# Patient Record
Sex: MALE | Race: WHITE | NOT HISPANIC OR LATINO | URBAN - METROPOLITAN AREA
[De-identification: names, ages, dates, MRNs, and addresses within clinical notes are randomized per-mention and may not be internally consistent; named-entity substitution may affect disease eponyms.]

---

## 2018-11-25 ENCOUNTER — EMERGENCY (EMERGENCY)
Facility: HOSPITAL | Age: 78
LOS: 1 days | Discharge: HOME | End: 2018-11-25
Attending: EMERGENCY MEDICINE | Admitting: EMERGENCY MEDICINE
Payer: MEDICARE

## 2018-11-25 VITALS
HEART RATE: 81 BPM | TEMPERATURE: 97 F | OXYGEN SATURATION: 98 % | SYSTOLIC BLOOD PRESSURE: 155 MMHG | DIASTOLIC BLOOD PRESSURE: 91 MMHG | RESPIRATION RATE: 17 BRPM

## 2018-11-25 VITALS
HEART RATE: 63 BPM | DIASTOLIC BLOOD PRESSURE: 73 MMHG | OXYGEN SATURATION: 98 % | TEMPERATURE: 98 F | RESPIRATION RATE: 16 BRPM | SYSTOLIC BLOOD PRESSURE: 148 MMHG

## 2018-11-25 LAB
ALBUMIN SERPL ELPH-MCNC: 3.9 G/DL — SIGNIFICANT CHANGE UP (ref 3.3–5)
ALP SERPL-CCNC: 46 U/L — SIGNIFICANT CHANGE UP (ref 40–120)
ALT FLD-CCNC: 11 U/L — SIGNIFICANT CHANGE UP (ref 10–45)
ANION GAP SERPL CALC-SCNC: 13 MMOL/L — SIGNIFICANT CHANGE UP (ref 5–17)
APTT BLD: 32.4 SEC — SIGNIFICANT CHANGE UP (ref 27.5–36.3)
AST SERPL-CCNC: 24 U/L — SIGNIFICANT CHANGE UP (ref 10–40)
BASOPHILS NFR BLD AUTO: 0.5 % — SIGNIFICANT CHANGE UP (ref 0–2)
BILIRUB SERPL-MCNC: 1 MG/DL — SIGNIFICANT CHANGE UP (ref 0.2–1.2)
BUN SERPL-MCNC: 15 MG/DL — SIGNIFICANT CHANGE UP (ref 7–23)
CALCIUM SERPL-MCNC: 9.2 MG/DL — SIGNIFICANT CHANGE UP (ref 8.4–10.5)
CHLORIDE SERPL-SCNC: 97 MMOL/L — SIGNIFICANT CHANGE UP (ref 96–108)
CO2 SERPL-SCNC: 26 MMOL/L — SIGNIFICANT CHANGE UP (ref 22–31)
CREAT SERPL-MCNC: 0.68 MG/DL — SIGNIFICANT CHANGE UP (ref 0.5–1.3)
EOSINOPHIL NFR BLD AUTO: 4.6 % — SIGNIFICANT CHANGE UP (ref 0–6)
GLUCOSE SERPL-MCNC: 114 MG/DL — HIGH (ref 70–99)
HCT VFR BLD CALC: 37.5 % — LOW (ref 39–50)
HGB BLD-MCNC: 12.4 G/DL — LOW (ref 13–17)
INR BLD: 1.31 — HIGH (ref 0.88–1.16)
LYMPHOCYTES # BLD AUTO: 17 % — SIGNIFICANT CHANGE UP (ref 13–44)
MCHC RBC-ENTMCNC: 29.6 PG — SIGNIFICANT CHANGE UP (ref 27–34)
MCHC RBC-ENTMCNC: 33.1 G/DL — SIGNIFICANT CHANGE UP (ref 32–36)
MCV RBC AUTO: 89.5 FL — SIGNIFICANT CHANGE UP (ref 80–100)
MONOCYTES NFR BLD AUTO: 9.2 % — SIGNIFICANT CHANGE UP (ref 2–14)
NEUTROPHILS NFR BLD AUTO: 68.7 % — SIGNIFICANT CHANGE UP (ref 43–77)
PLATELET # BLD AUTO: 294 K/UL — SIGNIFICANT CHANGE UP (ref 150–400)
POTASSIUM SERPL-MCNC: 4.4 MMOL/L — SIGNIFICANT CHANGE UP (ref 3.5–5.3)
POTASSIUM SERPL-SCNC: 4.4 MMOL/L — SIGNIFICANT CHANGE UP (ref 3.5–5.3)
PROT SERPL-MCNC: 6.5 G/DL — SIGNIFICANT CHANGE UP (ref 6–8.3)
PROTHROM AB SERPL-ACNC: 14.9 SEC — HIGH (ref 10–12.9)
RBC # BLD: 4.19 M/UL — LOW (ref 4.2–5.8)
RBC # FLD: 12.7 % — SIGNIFICANT CHANGE UP (ref 10.3–16.9)
SODIUM SERPL-SCNC: 136 MMOL/L — SIGNIFICANT CHANGE UP (ref 135–145)
WBC # BLD: 7.6 K/UL — SIGNIFICANT CHANGE UP (ref 3.8–10.5)
WBC # FLD AUTO: 7.6 K/UL — SIGNIFICANT CHANGE UP (ref 3.8–10.5)

## 2018-11-25 PROCEDURE — 85610 PROTHROMBIN TIME: CPT

## 2018-11-25 PROCEDURE — 99284 EMERGENCY DEPT VISIT MOD MDM: CPT | Mod: 25

## 2018-11-25 PROCEDURE — 85025 COMPLETE CBC W/AUTO DIFF WBC: CPT

## 2018-11-25 PROCEDURE — 70450 CT HEAD/BRAIN W/O DYE: CPT

## 2018-11-25 PROCEDURE — 85730 THROMBOPLASTIN TIME PARTIAL: CPT

## 2018-11-25 PROCEDURE — 36415 COLL VENOUS BLD VENIPUNCTURE: CPT

## 2018-11-25 PROCEDURE — 80053 COMPREHEN METABOLIC PANEL: CPT

## 2018-11-25 PROCEDURE — 12001 RPR S/N/AX/GEN/TRNK 2.5CM/<: CPT

## 2018-11-25 PROCEDURE — 93005 ELECTROCARDIOGRAM TRACING: CPT | Mod: XU

## 2018-11-25 PROCEDURE — 70450 CT HEAD/BRAIN W/O DYE: CPT | Mod: 26

## 2018-11-25 NOTE — ED ADULT NURSE NOTE - OBJECTIVE STATEMENT
AOX3 +ambulatory 78y old male hx of vertigo complaining of dizziness and fell on the street today. Patient sustained 1.5 cm laceration on the back of the head noted with minimal bleeding. Patient denies any LOC not on any blood thinners. Wound irrigated with NS. AOX3 +ambulatory 78y old male hx of vertigo complaining of dizziness and fell on the street today. Patient sustained 1.5 cm laceration on the back of the head noted with minimal bleeding. Patient denies any LOC, chest pains and not on any blood thinners.  Wound irrigated with NS. Unk TDAP.

## 2018-11-25 NOTE — ED ADULT NURSE NOTE - CHIEF COMPLAINT QUOTE
biba A&Ox4 c/o pain to back of head.  Pt got out of [employer], got lightheaded, fell backwards, hit his head on the pavement.  Hx vertigo.  Denies loc, neck / back pain, use of thinners.  .  EKG in progress

## 2018-11-25 NOTE — ED ADULT TRIAGE NOTE - CHIEF COMPLAINT QUOTE
biba A&Ox4 c/o pain to back of head.  Pt got out of [employer], got lightheaded, fell backwards, hit his head on the pavement.  Hx vertigo.  Denies loc, neck / back pain, use of thinners.  .  EKG in progress

## 2018-11-25 NOTE — ED PROVIDER NOTE - MEDICAL DECISION MAKING DETAILS
Pt with small laceration to left scalp - no LOC, no focal neuro deficits, ct head neg, lac repaired with two staples.  see procedure note.  Pt received wound instructions.

## 2018-11-25 NOTE — ED ADULT NURSE NOTE - NSIMPLEMENTINTERV_GEN_ALL_ED
Implemented All Fall with Harm Risk Interventions:  [place] to call system. Call bell, personal items and telephone within reach. Instruct patient to call for assistance. Room bathroom lighting operational. Non-slip footwear when patient is off stretcher. Physically safe environment: no spills, clutter or unnecessary equipment. Stretcher in lowest position, wheels locked, appropriate side rails in place. Provide visual cue, wrist band, yellow gown, etc. Monitor gait and stability. Monitor for mental status changes and reorient to person, place, and time. Review medications for side effects contributing to fall risk. Reinforce activity limits and safety measures with patient and family. Provide visual clues: red socks.

## 2018-11-29 DIAGNOSIS — S09.90XA UNSPECIFIED INJURY OF HEAD, INITIAL ENCOUNTER: ICD-10-CM
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
## 2018-11-29 DIAGNOSIS — R42 DIZZINESS AND GIDDINESS: ICD-10-CM

## 2018-11-29 DIAGNOSIS — S01.01XA LACERATION WITHOUT FOREIGN BODY OF SCALP, INITIAL ENCOUNTER: ICD-10-CM
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
## 2018-12-03 ENCOUNTER — EMERGENCY (EMERGENCY)
Facility: HOSPITAL | Age: 78
LOS: 1 days | Discharge: HOME | End: 2018-12-03
Admitting: EMERGENCY MEDICINE
Payer: MEDICARE

## 2018-12-03 VITALS
OXYGEN SATURATION: 96 % | RESPIRATION RATE: 20 BRPM | SYSTOLIC BLOOD PRESSURE: 138 MMHG | WEIGHT: 149.91 LBS | HEART RATE: 82 BPM | TEMPERATURE: 98 F | DIASTOLIC BLOOD PRESSURE: 73 MMHG

## 2018-12-03 PROCEDURE — 99212 OFFICE O/P EST SF 10 MIN: CPT

## 2018-12-03 NOTE — ED PROVIDER NOTE - OBJECTIVE STATEMENT
77 y/o male with hx of vertigo, parkinsons here for staple removal. pt reports staples placed to scalp 7 days ago. no fever or chills. no d/c. no ha. pt reports currently feeling well.

## 2018-12-03 NOTE — ED ADULT NURSE NOTE - NSIMPLEMENTINTERV_GEN_ALL_ED
Implemented All Universal Safety Interventions:  [place] to call system. Call bell, personal items and telephone within reach. Instruct patient to call for assistance. Room bathroom lighting operational. Non-slip footwear when patient is off stretcher. Physically safe environment: no spills, clutter or unnecessary equipment. Stretcher in lowest position, wheels locked, appropriate side rails in place.

## 2018-12-03 NOTE — ED PROVIDER NOTE - MEDICAL DECISION MAKING DETAILS
staple removal. wound healing well. no evidence of infection. staples removed without complication. f/u with pmd

## 2018-12-03 NOTE — ED ADULT NURSE NOTE - CHPI ED NUR SYMPTOMS NEG
no tingling/no pain/no fever/no nausea/no decreased eating/drinking/no dizziness/no vomiting/no chills/no weakness

## 2018-12-07 DIAGNOSIS — S01.01XD LACERATION WITHOUT FOREIGN BODY OF SCALP, SUBSEQUENT ENCOUNTER: ICD-10-CM
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
## 2020-03-25 NOTE — ED ADULT NURSE NOTE - RESPIRATORY WDL
<<----- Click to add NO significant Past Surgical History
Breathing spontaneous and unlabored. Breath sounds clear and equal bilaterally with regular rhythm.

## 2024-03-24 ENCOUNTER — INPATIENT (INPATIENT)
Facility: HOSPITAL | Age: 84
LOS: 5 days | Discharge: HOME HEALTH | DRG: 178 | End: 2024-03-30
Attending: INTERNAL MEDICINE | Admitting: STUDENT IN AN ORGANIZED HEALTH CARE EDUCATION/TRAINING PROGRAM
Payer: MEDICARE

## 2024-03-24 VITALS
OXYGEN SATURATION: 99 % | HEIGHT: 68 IN | HEART RATE: 65 BPM | TEMPERATURE: 98 F | WEIGHT: 151.02 LBS | DIASTOLIC BLOOD PRESSURE: 76 MMHG | RESPIRATION RATE: 18 BRPM | SYSTOLIC BLOOD PRESSURE: 159 MMHG

## 2024-03-24 DIAGNOSIS — R42 DIZZINESS AND GIDDINESS: ICD-10-CM
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
## 2024-03-24 DIAGNOSIS — D64.9 ANEMIA, UNSPECIFIED: ICD-10-CM

## 2024-03-24 DIAGNOSIS — U07.1 COVID-19: ICD-10-CM

## 2024-03-24 DIAGNOSIS — Z98.49 CATARACT EXTRACTION STATUS, UNSPECIFIED EYE: Chronic | ICD-10-CM

## 2024-03-24 DIAGNOSIS — E87.1 HYPO-OSMOLALITY AND HYPONATREMIA: ICD-10-CM

## 2024-03-24 DIAGNOSIS — I44.7 LEFT BUNDLE-BRANCH BLOCK, UNSPECIFIED: ICD-10-CM

## 2024-03-24 DIAGNOSIS — Z94.7 CORNEAL TRANSPLANT STATUS: Chronic | ICD-10-CM

## 2024-03-24 DIAGNOSIS — K21.9 GASTRO-ESOPHAGEAL REFLUX DISEASE WITHOUT ESOPHAGITIS: ICD-10-CM

## 2024-03-24 DIAGNOSIS — R53.1 WEAKNESS: ICD-10-CM

## 2024-03-24 DIAGNOSIS — Z29.9 ENCOUNTER FOR PROPHYLACTIC MEASURES, UNSPECIFIED: ICD-10-CM

## 2024-03-24 DIAGNOSIS — Z86.69 PERSONAL HISTORY OF OTHER DISEASES OF THE NERVOUS SYSTEM AND SENSE ORGANS: ICD-10-CM

## 2024-03-24 DIAGNOSIS — Z86.79 PERSONAL HISTORY OF OTHER DISEASES OF THE CIRCULATORY SYSTEM: ICD-10-CM

## 2024-03-24 PROBLEM — G20 PARKINSON'S DISEASE: Chronic | Status: ACTIVE | Noted: 2018-12-03

## 2024-03-24 LAB
ALBUMIN SERPL ELPH-MCNC: 4 G/DL — SIGNIFICANT CHANGE UP (ref 3.3–5)
ALP SERPL-CCNC: 52 U/L — SIGNIFICANT CHANGE UP (ref 40–120)
ALT FLD-CCNC: 24 U/L — SIGNIFICANT CHANGE UP (ref 10–45)
ANION GAP SERPL CALC-SCNC: 10 MMOL/L — SIGNIFICANT CHANGE UP (ref 5–17)
ANISOCYTOSIS BLD QL: SLIGHT — SIGNIFICANT CHANGE UP
APPEARANCE UR: ABNORMAL
AST SERPL-CCNC: 42 U/L — HIGH (ref 10–40)
BACTERIA # UR AUTO: NEGATIVE /HPF — SIGNIFICANT CHANGE UP
BASOPHILS # BLD AUTO: 0 K/UL — SIGNIFICANT CHANGE UP (ref 0–0.2)
BASOPHILS NFR BLD AUTO: 0 % — SIGNIFICANT CHANGE UP (ref 0–2)
BILIRUB SERPL-MCNC: 1 MG/DL — SIGNIFICANT CHANGE UP (ref 0.2–1.2)
BILIRUB UR-MCNC: NEGATIVE — SIGNIFICANT CHANGE UP
BUN SERPL-MCNC: 22 MG/DL — SIGNIFICANT CHANGE UP (ref 7–23)
BURR CELLS BLD QL SMEAR: PRESENT — SIGNIFICANT CHANGE UP
CALCIUM SERPL-MCNC: 9.6 MG/DL — SIGNIFICANT CHANGE UP (ref 8.4–10.5)
CHLORIDE SERPL-SCNC: 97 MMOL/L — SIGNIFICANT CHANGE UP (ref 96–108)
CO2 SERPL-SCNC: 25 MMOL/L — SIGNIFICANT CHANGE UP (ref 22–31)
COLOR SPEC: YELLOW — SIGNIFICANT CHANGE UP
CREAT SERPL-MCNC: 0.85 MG/DL — SIGNIFICANT CHANGE UP (ref 0.5–1.3)
DIFF PNL FLD: ABNORMAL
EGFR: 86 ML/MIN/1.73M2 — SIGNIFICANT CHANGE UP
EOSINOPHIL # BLD AUTO: 0 K/UL — SIGNIFICANT CHANGE UP (ref 0–0.5)
EOSINOPHIL NFR BLD AUTO: 0 % — SIGNIFICANT CHANGE UP (ref 0–6)
FLUAV AG NPH QL: SIGNIFICANT CHANGE UP
FLUBV AG NPH QL: SIGNIFICANT CHANGE UP
GLUCOSE SERPL-MCNC: 97 MG/DL — SIGNIFICANT CHANGE UP (ref 70–99)
GLUCOSE UR QL: NEGATIVE MG/DL — SIGNIFICANT CHANGE UP
HCT VFR BLD CALC: 35.6 % — LOW (ref 39–50)
HGB BLD-MCNC: 11.6 G/DL — LOW (ref 13–17)
KETONES UR-MCNC: 15 MG/DL
LEUKOCYTE ESTERASE UR-ACNC: ABNORMAL
LYMPHOCYTES # BLD AUTO: 0.6 K/UL — LOW (ref 1–3.3)
LYMPHOCYTES # BLD AUTO: 10.4 % — LOW (ref 13–44)
MANUAL SMEAR VERIFICATION: SIGNIFICANT CHANGE UP
MCHC RBC-ENTMCNC: 30.3 PG — SIGNIFICANT CHANGE UP (ref 27–34)
MCHC RBC-ENTMCNC: 32.6 GM/DL — SIGNIFICANT CHANGE UP (ref 32–36)
MCV RBC AUTO: 93 FL — SIGNIFICANT CHANGE UP (ref 80–100)
MICROCYTES BLD QL: SLIGHT — SIGNIFICANT CHANGE UP
MONOCYTES # BLD AUTO: 0.7 K/UL — SIGNIFICANT CHANGE UP (ref 0–0.9)
MONOCYTES NFR BLD AUTO: 12.2 % — SIGNIFICANT CHANGE UP (ref 2–14)
NEUTROPHILS # BLD AUTO: 4.44 K/UL — SIGNIFICANT CHANGE UP (ref 1.8–7.4)
NEUTROPHILS NFR BLD AUTO: 76.5 % — SIGNIFICANT CHANGE UP (ref 43–77)
NEUTS BAND # BLD: 0.9 % — SIGNIFICANT CHANGE UP (ref 0–8)
NITRITE UR-MCNC: NEGATIVE — SIGNIFICANT CHANGE UP
OVALOCYTES BLD QL SMEAR: SLIGHT — SIGNIFICANT CHANGE UP
PH UR: 7 — SIGNIFICANT CHANGE UP (ref 5–8)
PLAT MORPH BLD: NORMAL — SIGNIFICANT CHANGE UP
PLATELET # BLD AUTO: 204 K/UL — SIGNIFICANT CHANGE UP (ref 150–400)
POIKILOCYTOSIS BLD QL AUTO: SLIGHT — SIGNIFICANT CHANGE UP
POLYCHROMASIA BLD QL SMEAR: SLIGHT — SIGNIFICANT CHANGE UP
POTASSIUM SERPL-MCNC: 4 MMOL/L — SIGNIFICANT CHANGE UP (ref 3.5–5.3)
POTASSIUM SERPL-SCNC: 4 MMOL/L — SIGNIFICANT CHANGE UP (ref 3.5–5.3)
PROT SERPL-MCNC: 6.7 G/DL — SIGNIFICANT CHANGE UP (ref 6–8.3)
PROT UR-MCNC: SIGNIFICANT CHANGE UP MG/DL
RBC # BLD: 3.83 M/UL — LOW (ref 4.2–5.8)
RBC # FLD: 13 % — SIGNIFICANT CHANGE UP (ref 10.3–14.5)
RBC BLD AUTO: ABNORMAL
RBC CASTS # UR COMP ASSIST: 6 /HPF — HIGH (ref 0–4)
RSV RNA NPH QL NAA+NON-PROBE: SIGNIFICANT CHANGE UP
SARS-COV-2 RNA SPEC QL NAA+PROBE: DETECTED
SODIUM SERPL-SCNC: 132 MMOL/L — LOW (ref 135–145)
SP GR SPEC: 1.02 — SIGNIFICANT CHANGE UP (ref 1–1.03)
SQUAMOUS # UR AUTO: 0 /HPF — SIGNIFICANT CHANGE UP (ref 0–5)
UROBILINOGEN FLD QL: 1 MG/DL — SIGNIFICANT CHANGE UP (ref 0.2–1)
WBC # BLD: 5.74 K/UL — SIGNIFICANT CHANGE UP (ref 3.8–10.5)
WBC # FLD AUTO: 5.74 K/UL — SIGNIFICANT CHANGE UP (ref 3.8–10.5)
WBC UR QL: 1 /HPF — SIGNIFICANT CHANGE UP (ref 0–5)

## 2024-03-24 PROCEDURE — 99223 1ST HOSP IP/OBS HIGH 75: CPT

## 2024-03-24 PROCEDURE — 71045 X-RAY EXAM CHEST 1 VIEW: CPT | Mod: 26

## 2024-03-24 PROCEDURE — 70450 CT HEAD/BRAIN W/O DYE: CPT | Mod: 26

## 2024-03-24 PROCEDURE — 99285 EMERGENCY DEPT VISIT HI MDM: CPT

## 2024-03-24 RX ORDER — CARBIDOPA AND LEVODOPA 25; 100 MG/1; MG/1
1 TABLET ORAL
Refills: 0 | DISCHARGE

## 2024-03-24 RX ORDER — METOPROLOL TARTRATE 50 MG
1 TABLET ORAL
Refills: 0 | DISCHARGE

## 2024-03-24 RX ORDER — CARBIDOPA AND LEVODOPA 25; 100 MG/1; MG/1
1 TABLET ORAL THREE TIMES A DAY
Refills: 0 | Status: DISCONTINUED | OUTPATIENT
Start: 2024-03-24 | End: 2024-03-30

## 2024-03-24 RX ORDER — BENZOCAINE AND MENTHOL 5; 1 G/100ML; G/100ML
1 LIQUID ORAL
Refills: 0 | Status: DISCONTINUED | OUTPATIENT
Start: 2024-03-24 | End: 2024-03-30

## 2024-03-24 RX ORDER — ENOXAPARIN SODIUM 100 MG/ML
40 INJECTION SUBCUTANEOUS EVERY 24 HOURS
Refills: 0 | Status: DISCONTINUED | OUTPATIENT
Start: 2024-03-24 | End: 2024-03-30

## 2024-03-24 RX ORDER — ROSUVASTATIN CALCIUM 5 MG/1
1 TABLET ORAL
Refills: 0 | DISCHARGE

## 2024-03-24 RX ORDER — MECLIZINE HCL 12.5 MG
25 TABLET ORAL ONCE
Refills: 0 | Status: COMPLETED | OUTPATIENT
Start: 2024-03-24 | End: 2024-03-24

## 2024-03-24 RX ORDER — SODIUM CHLORIDE 9 MG/ML
1000 INJECTION INTRAMUSCULAR; INTRAVENOUS; SUBCUTANEOUS ONCE
Refills: 0 | Status: COMPLETED | OUTPATIENT
Start: 2024-03-24 | End: 2024-03-24

## 2024-03-24 RX ORDER — PANTOPRAZOLE SODIUM 20 MG/1
40 TABLET, DELAYED RELEASE ORAL
Refills: 0 | Status: DISCONTINUED | OUTPATIENT
Start: 2024-03-24 | End: 2024-03-30

## 2024-03-24 RX ORDER — METOPROLOL TARTRATE 50 MG
50 TABLET ORAL DAILY
Refills: 0 | Status: DISCONTINUED | OUTPATIENT
Start: 2024-03-24 | End: 2024-03-29

## 2024-03-24 RX ORDER — ATORVASTATIN CALCIUM 80 MG/1
40 TABLET, FILM COATED ORAL AT BEDTIME
Refills: 0 | Status: DISCONTINUED | OUTPATIENT
Start: 2024-03-24 | End: 2024-03-30

## 2024-03-24 RX ORDER — OMEPRAZOLE 10 MG/1
1 CAPSULE, DELAYED RELEASE ORAL
Refills: 0 | DISCHARGE

## 2024-03-24 RX ADMIN — SODIUM CHLORIDE 1000 MILLILITER(S): 9 INJECTION INTRAMUSCULAR; INTRAVENOUS; SUBCUTANEOUS at 10:05

## 2024-03-24 RX ADMIN — ENOXAPARIN SODIUM 40 MILLIGRAM(S): 100 INJECTION SUBCUTANEOUS at 22:13

## 2024-03-24 RX ADMIN — CARBIDOPA AND LEVODOPA 1 TABLET(S): 25; 100 TABLET ORAL at 21:24

## 2024-03-24 RX ADMIN — ATORVASTATIN CALCIUM 40 MILLIGRAM(S): 80 TABLET, FILM COATED ORAL at 21:24

## 2024-03-24 RX ADMIN — Medication 25 MILLIGRAM(S): at 10:13

## 2024-03-24 RX ADMIN — BENZOCAINE AND MENTHOL 1 LOZENGE: 5; 1 LIQUID ORAL at 18:45

## 2024-03-24 RX ADMIN — Medication 50 MILLIGRAM(S): at 18:24

## 2024-03-24 NOTE — H&P ADULT - PROBLEM SELECTOR PLAN 8
Hb 11.6, no active signs of bleed    - transfuse for Hb <7  - maintain active T&S Na 132 likely 2/2 poor PO intake. Received 1L NS in ED    - trend Na  - encourage PO intake  - obtain urine lytes if downtrending

## 2024-03-24 NOTE — H&P ADULT - NSHPPHYSICALEXAM_GEN_ALL_CORE
PHYSICAL EXAM:  GENERAL: Pt lying in bed comfortably in NAD  HEAD:  Atraumatic   EYES: EOMI, right eye opacity ~3-4mm non-reactive, left eye 2mm mildly reactive, conjunctiva and sclera clear  ENT: Moist mucous membranes  NECK: Supple, No JVD  CHEST/LUNG: Clear to auscultation bilaterally; No rales, rhonchi, wheezing or rubs. Unlabored respirations  HEART: Regular rate and rhythm; No murmurs, rubs, or gallops  ABDOMEN: Bowel sounds present; Soft, Nontender, Nondistended. No guarding or rigidity    EXTREMITIES:  2+ Peripheral Pulses, brisk capillary refill. No clubbing, cyanosis, or edema  NERVOUS SYSTEM:  Alert & Oriented X3, speech clear. Answers questions appropriately. Facial movements symmetrical, no facial droop, tongue protrusion midline. Full and equal 5/5 strength B/L upper and lower extremities. . Sensation intact. No motor drift. No deficits   MSK: FROM x 4 extremities   SKIN: No rashes or lesions

## 2024-03-24 NOTE — H&P ADULT - PROBLEM SELECTOR PLAN 1
Episode of fall due to lower extremity weakness this morning when ambulating to bathroom. Likely in setting of COVID-19 infection. CT head without acute pathology and neurology exam without focal deficit    - encourage PO hydration  - orthostatic  - TSH  - PT consult Episode of fall due to lower extremity weakness this morning when ambulating to bathroom. Likely in setting of COVID-19 infection. CT head without acute pathology and neurology exam without focal deficit    - encourage PO hydration  - orthostatic  - fall precaution  - TSH  - PT consult

## 2024-03-24 NOTE — H&P ADULT - NSHPSOCIALHISTORY_GEN_ALL_CORE
Lives at home, has health aides. No smoking, drinking, or recreational drug use. Retired, worked as a .

## 2024-03-24 NOTE — ED ADULT NURSE NOTE - CHIEF COMPLAINT QUOTE
Pt to ED c/o multiple falls over last 2 days. Pt denies syncope nor head strike. Per pt "My legs keep giving out and I have weakness". No fever, chills, n/v/d, chest pain, nor visual changes. HHA at bedside. no blood thinners.

## 2024-03-24 NOTE — ED ADULT NURSE NOTE - NS ED NURSE LEVEL OF CONSCIOUSNESS AFFECT
[FreeTextEntry1] : Spoke with patient extensively about diet. \par She is eating foods that are mostly simple carbs and sugar. Little vegetables or nutritious snacks\par The main issue is access to food and what is bought for the home tends to be these types of foods. \par Will reach out to nurse Elizabeth to advised better nutrition and exercise program Calm

## 2024-03-24 NOTE — H&P ADULT - PROBLEM SELECTOR PLAN 4
On carbidopa-levodopa  TID. Follows neurologist Dr. Berlin Sanchez    - continue carbidopa-levodopa  TID On carbidopa-levodopa  TID. Follows neurologist Dr. Berlin Sanchez    - continue carbidopa-levodopa  TID    #?HTN  #LBBB  Hx of LBBB. follows cardiologist outpatient. Takes metoprolol 50mg daily  - continue metoprolol 50mg daily with old parameters On carbidopa-levodopa  TID. Follows neurologist Dr. Berlin Sanchez    - continue carbidopa-levodopa  TID    #?HTN  Elevated blood pressures, SBP 150s, suspect history of HTN as patient on metoprolol (pt is unsure why he is on it)  - continue metoprolol 50mg daily with hold parameters

## 2024-03-24 NOTE — H&P ADULT - PROBLEM SELECTOR PLAN 9
Fluids: s/p 1L NS  Electrolytes: replete to keep k>4 and Mg>2  Nutrition: Regular diet  DVT ppx: Lovenox  GI ppx: pantoprazole  Code status: Full code  Dispo: RMF Hb 11.6, no active signs of bleed    - transfuse for Hb <7  - maintain active T&S

## 2024-03-24 NOTE — PATIENT PROFILE ADULT - FALL HARM RISK - HARM RISK INTERVENTIONS
Assistance with ambulation/Assistance OOB with selected safe patient handling equipment/Communicate Risk of Fall with Harm to all staff/Discuss with provider need for PT consult/Monitor gait and stability/Reinforce activity limits and safety measures with patient and family/Sit up slowly, dangle for a short time, stand at bedside before walking/Tailored Fall Risk Interventions/Visual Cue: Yellow wristband and red socks/Bed in lowest position, wheels locked, appropriate side rails in place/Call bell, personal items and telephone in reach/Instruct patient to call for assistance before getting out of bed or chair/Non-slip footwear when patient is out of bed/[place] to call system/Physically safe environment - no spills, clutter or unnecessary equipment/Purposeful Proactive Rounding/Room/bathroom lighting operational, light cord in reach

## 2024-03-24 NOTE — ED PROVIDER NOTE - CARE PLAN
1 Principal Discharge DX:	Acute weakness  Secondary Diagnosis:	Acute bronchitis due to COVID-19 virus

## 2024-03-24 NOTE — H&P ADULT - PROBLEM SELECTOR PLAN 2
Patient wife is informed  Likely 2/2 COVID-19 infection. Pt reports imbalance which he attributes to being unsteady on feet which is likely 2/2 his COVID-19 infection    - management as above in fall

## 2024-03-24 NOTE — ED PROVIDER NOTE - PHYSICAL EXAMINATION
CONSTITUTIONAL: Well-appearing; in no apparent distress.   HEAD: Normocephalic; atraumatic.   EYES:  conjunctiva and sclera clear  ENT: normal nose; no rhinorrhea; normal pharynx with no erythema or lesions.   NECK: Supple; non-tender;   CARDIOVASCULAR: Normal S1, S2; no murmurs, rubs, or gallops. Regular rate and rhythm.   RESPIRATORY: Breathing easily; breath sounds clear and equal bilaterally; no wheezes, rhonchi, or rales.  GI: Soft; non-distended; non-tender; no palpable organomegaly.   BACK: no tenderness over c/t/l spine  EXT: No cyanosis or edema; N/V intact  SKIN: Normal for age and race; warm; dry; good turgor; no apparent lesions or rash.   NEURO: A & O x 3; face symmetric; no pronator drift, able to range legs equally, strength in bed 5/5 on hip extension, knee flexion/extension, ankle flexion/extension. no pain w/ straight leg raise/. finger to nose normal.   PSYCHOLOGICAL: The patient’s mood and manner are appropriate.

## 2024-03-24 NOTE — H&P ADULT - PROBLEM SELECTOR PLAN 7
Na 132 likely 2/2 poor PO intake. Received 1L NS in ED    - trend Na  - encourage PO intake  - obtain urine lytes if downtrending Takes omeprazole 40mg daily    - continue pantoprazole

## 2024-03-24 NOTE — ED ADULT NURSE NOTE - NSFALLRISKINTERV_ED_ALL_ED

## 2024-03-24 NOTE — H&P ADULT - PROBLEM SELECTOR PLAN 6
Takes omeprazole 40mg daily    - continue pantoprazole Hx of LBBB. follows cardiologist outpatient. Takes metoprolol 50mg daily  - continue metoprolol 50mg daily with hold parameters

## 2024-03-24 NOTE — ED ADULT NURSE NOTE - OBJECTIVE STATEMENT
Pt is an 84yo male PMHx parkinsons, vertigo presents to ED c/o fall. Pt states, "My balance has been off the past few days d/t a pollen allergy I have, my legs feel weak and this morning I fell around 0800." Pt denies head trauma, LOC, use of blood thinners. Pt is A&Ox4,, breathing even and unlabored speaking in full sentences, denies c/p, sob, f/c. Pt is an 82yo male PMHx parkinsons, vertigo presents to ED c/o fall. Pt states, "My balance has been off the past few days d/t a pollen allergy I have, my legs feel weak and this morning I fell around 0800." Pt denies head trauma, LOC, use of blood thinners. Pt is A&Ox4,, breathing even and unlabored speaking in full sentences, denies c/p, sob, f/c. FS obtained, .

## 2024-03-24 NOTE — ED PROVIDER NOTE - CLINICAL SUMMARY MEDICAL DECISION MAKING FREE TEXT BOX
no focal weakness on exam, motor/sensory intact   however when try to get pt up to walk, unable to stand on own and maintain posture, even with assistance  likely 2/2 covid 19, doubt vertigo, no response to meclizine and pt states feels different no focal weakness on exam, motor/sensory intact , doubt stroke or peripheral vertigo at this time, no response to meclizine and pt states feels different  however when try to get pt up to walk, unable to stand on own and maintain posture, even with assistance  likely 2/2 symptomatic covid 19

## 2024-03-24 NOTE — PATIENT PROFILE ADULT - VISION (WITH CORRECTIVE LENSES IF THE PATIENT USUALLY WEARS THEM):
1 pair of glasses at home./Partially impaired: cannot see medication labels or newsprint, but can see obstacles in path, and the surrounding layout; can count fingers at arm's length

## 2024-03-24 NOTE — ED ADULT TRIAGE NOTE - CHIEF COMPLAINT QUOTE
Pt to ED c/o multiple falls over last 2 days. Pt denies syncope nor head strike. Per pt "My legs keep giving out and I have weakness". No fever, chills, n/v/d, chest pain, nor visual changes. HHA at bedside Pt to ED c/o multiple falls over last 2 days. Pt denies syncope nor head strike. Per pt "My legs keep giving out and I have weakness". No fever, chills, n/v/d, chest pain, nor visual changes. HHA at bedside. no blood thinners.

## 2024-03-24 NOTE — ED PROVIDER NOTE - OBJECTIVE STATEMENT
82yo male PMHx parkinsons, vertigo presents to ED c/o fall. Pt states, "My balance has been off the past few days d/t a pollen allergy I have, my legs feel weak and this morning I fell around 0800." Pt denies head trauma, LOC, use of blood thinners 84yo male PMHx parkinsons, vertigo presents to ED c/o fall. Pt states, "My balance has been off the past few days d/t a pollen allergy I have, my legs feel weak and this morning I fell around 0800." Pt denies head trauma, LOC, use of blood thinners. Denies any dizziness, room spinning. States that he feels like his legs give out when he stands, normally ambulates with walker and has a HHA, fell x 2 despite having HHA because could not support himself. Denies headache, double vision, trouble speaking/swallowing.

## 2024-03-24 NOTE — H&P ADULT - NSICDXFAMILYHX_GEN_ALL_CORE_FT
FAMILY HISTORY:  Father  Still living? Unknown  FH: diabetes mellitus, Age at diagnosis: Age Unknown  FH: heart failure, Age at diagnosis: Age Unknown  FH: HTN (hypertension), Age at diagnosis: Age Unknown

## 2024-03-24 NOTE — H&P ADULT - ATTENDING COMMENTS
84 yo M with PMHx Parkinsons disease, vertigo, GERD, known LBBB BIBEMS from home with 2d hx of recurrent falls in setting of generalized weakness found to have +COVID PCR admitted for further symptomatic management and PT evaluation.     #COVID-19 - Afebrile. HR 60s. BP stable. RR 18. SpO2 99% on RA at rest. WBC 5.74. Bandemia 0.9%. Remainder of CBC/CMP largely within normal limits. +COVID PCR positive. CXR showing increased bibasilar opacification, R>L. Sx of post-nasal drip and sore throat ~4-5d prior which pt attributed to seasonal allergies, now improving. Sx management. No indication for remdesevir/steroids at this time.      #Fall – LE weakness while ambulating. No cardiac complaints. EKG unchanged from prior, known LBBB. Neurological exam without focal neurological deficits. 5/5 strength throughout. No sensory deficits. Suspect imbalance i/s/o Parkinsons vs vertigo vs resolving acute illness (COVID+). CTH NC showing moderate chronic microvascular changes without evidence of an acute transcortical infarction or hemorrhage. F/U orthostatics, TSH. Fall precautions. PT consult.     Agree with remainder of resident plan as above.

## 2024-03-24 NOTE — H&P ADULT - NSHPLABSRESULTS_GEN_ALL_CORE
LABS:  cret                        11.6   5.74  )-----------( 204      ( 24 Mar 2024 09:36 )             35.6     03-24    132<L>  |  97  |  22  ----------------------------<  97  4.0   |  25  |  0.85    Ca    9.6      24 Mar 2024 09:36    TPro  6.7  /  Alb  4.0  /  TBili  1.0  /  DBili  x   /  AST  42<H>  /  ALT  24  /  AlkPhos  52  03-24

## 2024-03-24 NOTE — H&P ADULT - PROBLEM SELECTOR PLAN 3
COVID-19 positive, xray with RUL infiltrate, breathing comfortably on room air.    - Goal o2 sat >94%  - incentive spirometer and OOBTC  - no need for monoclonal antibody and no steroids as pt not hypoxic COVID-19 positive, xray with RUL infiltrate, breathing comfortably on room air.    - Goal o2 sat >94%  - incentive spirometer  - no need for monoclonal antibody and no steroids as pt not hypoxic COVID-19 positive, xray with RUL infiltrate, breathing comfortably on room air.    - Goal o2 sat >94%  - incentive spirometer  - Onset of symptoms >72 hours and without respiratory symptoms so no need for monoclonal antibody and no steroids as pt not hypoxic

## 2024-03-24 NOTE — H&P ADULT - PROBLEM SELECTOR PLAN 10
Fluids: s/p 1L NS  Electrolytes: replete to keep k>4 and Mg>2  Nutrition: Regular diet  DVT ppx: Lovenox  GI ppx: pantoprazole  Code status: Full code  Dispo: RMF

## 2024-03-24 NOTE — H&P ADULT - HISTORY OF PRESENT ILLNESS
CC: weakness, fall  HPI: 83 year old male with PMHx Parkinson's disease, vertigo, GERD, LBBB presents from home after having a fall. Pt reports fall was due to leg weakness and was not mechanical in nature and denies head trauma but did bump into furniture on his back. Reports was walking to kitchen this morning when he developed bilateral LE weakness and fell. Since thursday, pt reports having "allergy symptoms" with sneezing and runny nose and reports he has flares of his allergy occasionally, worse during spring/summer. Around the same time he noticed having imbalance which he attributes to the unsteadiness and weakness of his legs. No recent travel or sick contact but does have health aid who come and go. Denies headache, dizziness, fever, chills, chest pain, SOB, abd pain, dysuria or diarrhea.    In the ED:    - VS: Tmax: 98.2, HR: 65, BP: 159/76, RR: 18, O2: 99% room air     - Pertinent Labs: Hb 11.6, Na 132, + for COVID-19    - Imaging: CXR: RUL infiltrate  CT head: moderate chronic microvascular change    - Treatment/interventions: meclizine 25mg x1, 1L NS

## 2024-03-24 NOTE — ED PROVIDER NOTE - NSCAREINITIATED _GEN_ER
CC:  Kina Turner is here today for Establish Care (Alternate hard to normal stools, daily. Swallow issues on solid foods. Lt knee pain for 1 yr now.)       Medications: medications verified and updated    Refills needed today?  NO    Latex allergy or sensitivity: No known latex allergy     Patient would like communication of their results via:      Cell Phone:   Telephone Information:   Mobile [phone]     Okay to leave a message containing results? No    Patient's current [facility] status: Active.    Advanced directives: discussed    Care Teams Verified: Updated    Depression Screen: no    Tobacco history: verified           Sara Maya(Attending)

## 2024-03-24 NOTE — H&P ADULT - PROBLEM SELECTOR PLAN 5
Reports having history of vertigo numerous times per year and takes meclizine PRN with improvement. Meclizine given in ED however deferred ambulation in setting of recent fall    - meclizine 25mg q6h PRN

## 2024-03-25 LAB
ANION GAP SERPL CALC-SCNC: 9 MMOL/L — SIGNIFICANT CHANGE UP (ref 5–17)
BUN SERPL-MCNC: 15 MG/DL — SIGNIFICANT CHANGE UP (ref 7–23)
CALCIUM SERPL-MCNC: 8.4 MG/DL — SIGNIFICANT CHANGE UP (ref 8.4–10.5)
CHLORIDE SERPL-SCNC: 98 MMOL/L — SIGNIFICANT CHANGE UP (ref 96–108)
CO2 SERPL-SCNC: 25 MMOL/L — SIGNIFICANT CHANGE UP (ref 22–31)
CREAT SERPL-MCNC: 0.73 MG/DL — SIGNIFICANT CHANGE UP (ref 0.5–1.3)
EGFR: 90 ML/MIN/1.73M2 — SIGNIFICANT CHANGE UP
GLUCOSE SERPL-MCNC: 87 MG/DL — SIGNIFICANT CHANGE UP (ref 70–99)
HCT VFR BLD CALC: 35.5 % — LOW (ref 39–50)
HGB BLD-MCNC: 11.6 G/DL — LOW (ref 13–17)
MAGNESIUM SERPL-MCNC: 1.8 MG/DL — SIGNIFICANT CHANGE UP (ref 1.6–2.6)
MCHC RBC-ENTMCNC: 30.4 PG — SIGNIFICANT CHANGE UP (ref 27–34)
MCHC RBC-ENTMCNC: 32.7 GM/DL — SIGNIFICANT CHANGE UP (ref 32–36)
MCV RBC AUTO: 93.2 FL — SIGNIFICANT CHANGE UP (ref 80–100)
NRBC # BLD: 0 /100 WBCS — SIGNIFICANT CHANGE UP (ref 0–0)
PHOSPHATE SERPL-MCNC: 3 MG/DL — SIGNIFICANT CHANGE UP (ref 2.5–4.5)
PLATELET # BLD AUTO: 176 K/UL — SIGNIFICANT CHANGE UP (ref 150–400)
POTASSIUM SERPL-MCNC: 3.9 MMOL/L — SIGNIFICANT CHANGE UP (ref 3.5–5.3)
POTASSIUM SERPL-SCNC: 3.9 MMOL/L — SIGNIFICANT CHANGE UP (ref 3.5–5.3)
RBC # BLD: 3.81 M/UL — LOW (ref 4.2–5.8)
RBC # FLD: 13 % — SIGNIFICANT CHANGE UP (ref 10.3–14.5)
SODIUM SERPL-SCNC: 132 MMOL/L — LOW (ref 135–145)
TSH SERPL-MCNC: 3.49 UIU/ML — SIGNIFICANT CHANGE UP (ref 0.27–4.2)
WBC # BLD: 6.16 K/UL — SIGNIFICANT CHANGE UP (ref 3.8–10.5)
WBC # FLD AUTO: 6.16 K/UL — SIGNIFICANT CHANGE UP (ref 3.8–10.5)

## 2024-03-25 PROCEDURE — 99232 SBSQ HOSP IP/OBS MODERATE 35: CPT

## 2024-03-25 RX ADMIN — ATORVASTATIN CALCIUM 40 MILLIGRAM(S): 80 TABLET, FILM COATED ORAL at 22:26

## 2024-03-25 RX ADMIN — CARBIDOPA AND LEVODOPA 1 TABLET(S): 25; 100 TABLET ORAL at 22:26

## 2024-03-25 RX ADMIN — CARBIDOPA AND LEVODOPA 1 TABLET(S): 25; 100 TABLET ORAL at 06:05

## 2024-03-25 RX ADMIN — CARBIDOPA AND LEVODOPA 1 TABLET(S): 25; 100 TABLET ORAL at 13:27

## 2024-03-25 RX ADMIN — PANTOPRAZOLE SODIUM 40 MILLIGRAM(S): 20 TABLET, DELAYED RELEASE ORAL at 06:05

## 2024-03-25 RX ADMIN — ENOXAPARIN SODIUM 40 MILLIGRAM(S): 100 INJECTION SUBCUTANEOUS at 22:26

## 2024-03-25 NOTE — DIETITIAN INITIAL EVALUATION ADULT - PROBLEM SELECTOR PLAN 2
Likely 2/2 COVID-19 infection. Pt reports imbalance which he attributes to being unsteady on feet which is likely 2/2 his COVID-19 infection    - management as above in fall

## 2024-03-25 NOTE — PROGRESS NOTE ADULT - ASSESSMENT
83 year old male with PMHx Parkinson's disease, vertigo, GERD, LBBB presents from home after having a fall due to weakness and found to be positive for COVID-19 and admitted for further management. 83 year old male with PMHx Parkinson's disease, vertigo, GERD, LBBB presents from home after having a fall due to weakness and found to be positive for COVID-19 and admitted for further management. Respiratory exam unremarkable. No changes compared to prior EKG. CXR displaying RUL infiltrate. No acute infarct or hemorrhage on non-con CT. Will ambulate with PT. 83M with PMH of Parkinson's disease, vertigo, GERD, LBBB presenting with fall due to weakness likely 2/2 COVID-19 pending PT eval.

## 2024-03-25 NOTE — DIETITIAN INITIAL EVALUATION ADULT - PHYSCIAL ASSESSMENT
Weights:   3/24 151 pounds (dosing)   Per HIE:  10/27 153 pounds   UBW: ~150 pounds (per pt) Endorses stable weights x 3 months  IBW: 154 pounds   %IBW: 98%      Weights appear to be stable. RD to continue to monitor weights as available.

## 2024-03-25 NOTE — PROGRESS NOTE ADULT - PROBLEM SELECTOR PLAN 7
Takes omeprazole 40mg daily    - continue pantoprazole Takes omeprazole 40mg daily  - continue pantoprazole

## 2024-03-25 NOTE — PROGRESS NOTE ADULT - PROBLEM SELECTOR PLAN 8
Na 132 likely 2/2 poor PO intake. Received 1L NS in ED    - trend Na  - encourage PO intake  - obtain urine lytes if downtrending Na 132 likely 2/2 poor PO intake. Received 1L NS in ED  - trend Na  - encourage PO intake  - obtain urine lytes if downtrending

## 2024-03-25 NOTE — DIETITIAN INITIAL EVALUATION ADULT - PERTINENT LABORATORY DATA
03-25    132<L>  |  98  |  15  ----------------------------<  87  3.9   |  25  |  0.73    Ca    8.4      25 Mar 2024 05:30  Phos  3.0     03-25  Mg     1.8     03-25    TPro  6.7  /  Alb  4.0  /  TBili  1.0  /  DBili  x   /  AST  42<H>  /  ALT  24  /  AlkPhos  52  03-24

## 2024-03-25 NOTE — DIETITIAN INITIAL EVALUATION ADULT - PROBLEM SELECTOR PLAN 6
Hx of LBBB. follows cardiologist outpatient. Takes metoprolol 50mg daily  - continue metoprolol 50mg daily with hold parameters

## 2024-03-25 NOTE — PROGRESS NOTE ADULT - PROBLEM SELECTOR PLAN 4
On carbidopa-levodopa  TID. Follows neurologist Dr. Berlin Sanchez    - continue carbidopa-levodopa  TID    #?HTN  Elevated blood pressures, SBP 150s, suspect history of HTN as patient on metoprolol (pt is unsure why he is on it)  - continue metoprolol 50mg daily with hold parameters On carbidopa-levodopa  TID. Follows neurologist Dr. Berlin Sanchez  - continue carbidopa-levodopa  TID    #HTN  Elevated blood pressures on admission with SBP 150s. Patient states he was on Lisinopril and Metoprolol, however Lisinopril was discontinued since BPs were well controlled. Pt is unsure why he is still on Metoprolol  - continue metoprolol 50mg daily  - f/u PCP

## 2024-03-25 NOTE — PROGRESS NOTE ADULT - SUBJECTIVE AND OBJECTIVE BOX
OVERNIGHT EVENTS:    SUBJECTIVE / INTERVAL HPI: Patient seen and examined at bedside. No complaints at this time. Patient denies: fever, chills, dizziness, weakness, HA, Changes in vision, CP, palpitations, SOB, cough, N/V/D/C, dysuria, changes in bowel movements, LE edema. ROS otherwise negative.    VITAL SIGNS:  Vital Signs Last 24 Hrs  T(C): 36.4 (25 Mar 2024 03:45), Max: 37 (24 Mar 2024 14:47)  T(F): 97.6 (25 Mar 2024 03:45), Max: 98.6 (24 Mar 2024 14:47)  HR: 58 (25 Mar 2024 03:45) (58 - 78)  BP: 150/83 (25 Mar 2024 03:45) (147/83 - 163/72)  BP(mean): --  RR: 17 (25 Mar 2024 03:45) (17 - 18)  SpO2: 99% (25 Mar 2024 03:45) (96% - 99%)    Parameters below as of 25 Mar 2024 03:45  Patient On (Oxygen Delivery Method): room air        PHYSICAL EXAM:    General: NAD  HEENT: NCAT  Neck: supple, trachea midline  Cardiovascular: S1, S2 normal; RRR, no M/G/R  Respiratory: CTABL; no W/R/R  Gastrointestinal: soft, nontender, nondistended. bowel sounds present.  Skin: no ulcerations or visible rashes appreciated  Extremities: WWP; no edema, clubbing or cyanosis  Vascular: 2+ radial, DP/PT pulses B/L  Neurological: AAOx3; CN II-XII grossly intact; no focal deficits    MEDICATIONS:  MEDICATIONS  (STANDING):  atorvastatin 40 milliGRAM(s) Oral at bedtime  carbidopa/levodopa  25/100 1 Tablet(s) Oral three times a day  enoxaparin Injectable 40 milliGRAM(s) SubCutaneous every 24 hours  metoprolol succinate ER 50 milliGRAM(s) Oral daily  pantoprazole    Tablet 40 milliGRAM(s) Oral before breakfast    MEDICATIONS  (PRN):  benzocaine/menthol Lozenge 1 Lozenge Oral two times a day PRN Sore Throat      ALLERGIES:  Allergies    No Known Allergies    Intolerances        LABS:                        11.6   5.74  )-----------( 204      ( 24 Mar 2024 09:36 )             35.6     03-24    132<L>  |  97  |  22  ----------------------------<  97  4.0   |  25  |  0.85    Ca    9.6      24 Mar 2024 09:36    TPro  6.7  /  Alb  4.0  /  TBili  1.0  /  DBili  x   /  AST  42<H>  /  ALT  24  /  AlkPhos  52  03-24      Urinalysis Basic - ( 24 Mar 2024 09:36 )    Color: Yellow / Appearance: Cloudy / S.020 / pH: x  Gluc: 97 mg/dL / Ketone: 15 mg/dL  / Bili: Negative / Urobili: 1.0 mg/dL   Blood: x / Protein: Trace mg/dL / Nitrite: Negative   Leuk Esterase: Trace / RBC: 6 /HPF / WBC 1 /HPF   Sq Epi: x / Non Sq Epi: 0 /HPF / Bacteria: Negative /HPF      CAPILLARY BLOOD GLUCOSE      POCT Blood Glucose.: 101 mg/dL (24 Mar 2024 08:48)      RADIOLOGY & ADDITIONAL TESTS: Reviewed. OVERNIGHT EVENTS:    SUBJECTIVE / INTERVAL HPI: Patient seen and examined at bedside. No complaints at this time. ROS otherwise negative.    VITAL SIGNS:  Vital Signs Last 24 Hrs  T(C): 36.4 (25 Mar 2024 03:45), Max: 37 (24 Mar 2024 14:47)  T(F): 97.6 (25 Mar 2024 03:45), Max: 98.6 (24 Mar 2024 14:47)  HR: 58 (25 Mar 2024 03:45) (58 - 78)  BP: 150/83 (25 Mar 2024 03:45) (147/83 - 163/72)  BP(mean): --  RR: 17 (25 Mar 2024 03:45) (17 - 18)  SpO2: 99% (25 Mar 2024 03:45) (96% - 99%)    Parameters below as of 25 Mar 2024 03:45  Patient On (Oxygen Delivery Method): room air        PHYSICAL EXAM:    General: NAD  HEENT: NCAT  Neck: supple, trachea midline  Cardiovascular: S1, S2 normal; RRR, no M/G/R  Respiratory: CTABL; no W/R/R  Gastrointestinal: soft, nontender, nondistended. bowel sounds present.  Skin: no ulcerations or visible rashes appreciated  Extremities: WWP; no edema, clubbing or cyanosis  Vascular: 2+ radial, DP/PT pulses B/L  Neurological: AAOx3; CN II-XII grossly intact; no focal deficits    MEDICATIONS:  MEDICATIONS  (STANDING):  atorvastatin 40 milliGRAM(s) Oral at bedtime  carbidopa/levodopa  25/100 1 Tablet(s) Oral three times a day  enoxaparin Injectable 40 milliGRAM(s) SubCutaneous every 24 hours  metoprolol succinate ER 50 milliGRAM(s) Oral daily  pantoprazole    Tablet 40 milliGRAM(s) Oral before breakfast    MEDICATIONS  (PRN):  benzocaine/menthol Lozenge 1 Lozenge Oral two times a day PRN Sore Throat      ALLERGIES:  Allergies    No Known Allergies    Intolerances        LABS:                        11.6   5.74  )-----------( 204      ( 24 Mar 2024 09:36 )             35.6     03-24    132<L>  |  97  |  22  ----------------------------<  97  4.0   |    |  0.85    Ca    9.6      24 Mar 2024 09:36    TPro  6.7  /  Alb  4.0  /  TBili  1.0  /  DBili  x   /  AST  42<H>  /  ALT  24  /  AlkPhos  52  03-24      Urinalysis Basic - ( 24 Mar 2024 09:36 )    Color: Yellow / Appearance: Cloudy / S.020 / pH: x  Gluc: 97 mg/dL / Ketone: 15 mg/dL  / Bili: Negative / Urobili: 1.0 mg/dL   Blood: x / Protein: Trace mg/dL / Nitrite: Negative   Leuk Esterase: Trace / RBC: 6 /HPF / WBC 1 /HPF   Sq Epi: x / Non Sq Epi: 0 /HPF / Bacteria: Negative /HPF      CAPILLARY BLOOD GLUCOSE      POCT Blood Glucose.: 101 mg/dL (24 Mar 2024 08:48)      RADIOLOGY & ADDITIONAL TESTS: Reviewed. OVERNIGHT EVENTS:    SUBJECTIVE / INTERVAL HPI: Patient seen and examined at bedside. No complaints at this time, would like coffee. Denies any SOB, CP, N/V, or vertigo, ROS negative.    VITAL SIGNS:  Vital Signs Last 24 Hrs  T(C): 36.4 (25 Mar 2024 03:45), Max: 37 (24 Mar 2024 14:47)  T(F): 97.6 (25 Mar 2024 03:45), Max: 98.6 (24 Mar 2024 14:47)  HR: 58 (25 Mar 2024 03:45) (58 - 78)  BP: 150/83 (25 Mar 2024 03:45) (147/83 - 163/72)  BP(mean): --  RR: 17 (25 Mar 2024 03:45) (17 - 18)  SpO2: 99% (25 Mar 2024 03:45) (96% - 99%)    Parameters below as of 25 Mar 2024 03:45  Patient On (Oxygen Delivery Method): room air        PHYSICAL EXAM:    General: NAD, lying comfortably in bed  HEENT: NCAT  Neck: supple, trachea midline  Cardiovascular: S1, S2 normal; RRR, no M/G/R  Respiratory: CTABL; no W/R/R; normal inspiratory effort  Gastrointestinal: soft, nontender, nondistended. bowel sounds present.  Skin: no ulcerations or visible rashes appreciated  Extremities: WWP; no edema, clubbing or cyanosis  Neurological: AAOx3; CN II-XII grossly intact; no focal deficits    MEDICATIONS:  MEDICATIONS  (STANDING):  atorvastatin 40 milliGRAM(s) Oral at bedtime  carbidopa/levodopa  25/100 1 Tablet(s) Oral three times a day  enoxaparin Injectable 40 milliGRAM(s) SubCutaneous every 24 hours  metoprolol succinate ER 50 milliGRAM(s) Oral daily  pantoprazole    Tablet 40 milliGRAM(s) Oral before breakfast    MEDICATIONS  (PRN):  benzocaine/menthol Lozenge 1 Lozenge Oral two times a day PRN Sore Throat      ALLERGIES:  Allergies    No Known Allergies    Intolerances        LABS:                        11.6   5.74  )-----------( 204      ( 24 Mar 2024 09:36 )             35.6     03-24    132<L>  |  97  |  22  ----------------------------<  97  4.0   |  25  |  0.85    Ca    9.6      24 Mar 2024 09:36    TPro  6.7  /  Alb  4.0  /  TBili  1.0  /  DBili  x   /  AST  42<H>  /  ALT  24  /  AlkPhos  52  03-24      Urinalysis Basic - ( 24 Mar 2024 09:36 )    Color: Yellow / Appearance: Cloudy / S.020 / pH: x  Gluc: 97 mg/dL / Ketone: 15 mg/dL  / Bili: Negative / Urobili: 1.0 mg/dL   Blood: x / Protein: Trace mg/dL / Nitrite: Negative   Leuk Esterase: Trace / RBC: 6 /HPF / WBC 1 /HPF   Sq Epi: x / Non Sq Epi: 0 /HPF / Bacteria: Negative /HPF      CAPILLARY BLOOD GLUCOSE      POCT Blood Glucose.: 101 mg/dL (24 Mar 2024 08:48)      RADIOLOGY & ADDITIONAL TESTS: Reviewed. OVERNIGHT EVENTS:    SUBJECTIVE / INTERVAL HPI: Patient seen and examined at bedside. No complaints at this time, endorses PO intake, would like coffee. Denies any SOB, CP, N/V, or vertigo, ROS negative.    VITAL SIGNS:  Vital Signs Last 24 Hrs  T(C): 36.4 (25 Mar 2024 03:45), Max: 37 (24 Mar 2024 14:47)  T(F): 97.6 (25 Mar 2024 03:45), Max: 98.6 (24 Mar 2024 14:47)  HR: 58 (25 Mar 2024 03:45) (58 - 78)  BP: 150/83 (25 Mar 2024 03:45) (147/83 - 163/72)  BP(mean): --  RR: 17 (25 Mar 2024 03:45) (17 - 18)  SpO2: 99% (25 Mar 2024 03:45) (96% - 99%)    Parameters below as of 25 Mar 2024 03:45  Patient On (Oxygen Delivery Method): room air        PHYSICAL EXAM:    General: NAD, lying comfortably in bed  HEENT: NCAT  Neck: supple, trachea midline  Cardiovascular: S1, S2 normal; RRR, no M/G/R  Respiratory: CTABL; no W/R/R; normal inspiratory effort  Gastrointestinal: soft, nontender, nondistended. bowel sounds present.  Skin: no ulcerations or visible rashes appreciated  Extremities: WWP; no edema, clubbing or cyanosis  Neurological: AAOx3; CN II-XII grossly intact; no focal deficits    MEDICATIONS:  MEDICATIONS  (STANDING):  atorvastatin 40 milliGRAM(s) Oral at bedtime  carbidopa/levodopa  25/100 1 Tablet(s) Oral three times a day  enoxaparin Injectable 40 milliGRAM(s) SubCutaneous every 24 hours  metoprolol succinate ER 50 milliGRAM(s) Oral daily  pantoprazole    Tablet 40 milliGRAM(s) Oral before breakfast    MEDICATIONS  (PRN):  benzocaine/menthol Lozenge 1 Lozenge Oral two times a day PRN Sore Throat      ALLERGIES:  Allergies    No Known Allergies    Intolerances        LABS:                        11.6   5.74  )-----------( 204      ( 24 Mar 2024 09:36 )             35.6     03-24    132<L>  |  97  |  22  ----------------------------<  97  4.0   |  25  |  0.85    Ca    9.6      24 Mar 2024 09:36    TPro  6.7  /  Alb  4.0  /  TBili  1.0  /  DBili  x   /  AST  42<H>  /  ALT  24  /  AlkPhos  52  03-24      Urinalysis Basic - ( 24 Mar 2024 09:36 )    Color: Yellow / Appearance: Cloudy / S.020 / pH: x  Gluc: 97 mg/dL / Ketone: 15 mg/dL  / Bili: Negative / Urobili: 1.0 mg/dL   Blood: x / Protein: Trace mg/dL / Nitrite: Negative   Leuk Esterase: Trace / RBC: 6 /HPF / WBC 1 /HPF   Sq Epi: x / Non Sq Epi: 0 /HPF / Bacteria: Negative /HPF      CAPILLARY BLOOD GLUCOSE      POCT Blood Glucose.: 101 mg/dL (24 Mar 2024 08:48)      RADIOLOGY & ADDITIONAL TESTS: Reviewed. OVERNIGHT EVENTS: none    SUBJECTIVE / INTERVAL HPI: Patient seen and examined at bedside. No complaints at this time, endorses PO intake, would like coffee. Denies any SOB, CP, N/V, or vertigo, ROS negative.    VITAL SIGNS:  Vital Signs Last 24 Hrs  T(C): 36.4 (25 Mar 2024 03:45), Max: 37 (24 Mar 2024 14:47)  T(F): 97.6 (25 Mar 2024 03:45), Max: 98.6 (24 Mar 2024 14:47)  HR: 58 (25 Mar 2024 03:45) (58 - 78)  BP: 150/83 (25 Mar 2024 03:45) (147/83 - 163/72)  BP(mean): --  RR: 17 (25 Mar 2024 03:45) (17 - 18)  SpO2: 99% (25 Mar 2024 03:45) (96% - 99%)    Parameters below as of 25 Mar 2024 03:45  Patient On (Oxygen Delivery Method): room air        PHYSICAL EXAM:    General: NAD, lying comfortably in bed  HEENT: NCAT  Neck: supple, trachea midline  Cardiovascular: S1, S2 normal; RRR, no M/G/R  Respiratory: CTABL; no W/R/R; normal inspiratory effort  Gastrointestinal: soft, nontender, nondistended. bowel sounds present.  Skin: no ulcerations or visible rashes appreciated  Extremities: WWP; no edema, clubbing or cyanosis  Neurological: AAOx3; CN II-XII grossly intact; no focal deficits    MEDICATIONS:  MEDICATIONS  (STANDING):  atorvastatin 40 milliGRAM(s) Oral at bedtime  carbidopa/levodopa  25/100 1 Tablet(s) Oral three times a day  enoxaparin Injectable 40 milliGRAM(s) SubCutaneous every 24 hours  metoprolol succinate ER 50 milliGRAM(s) Oral daily  pantoprazole    Tablet 40 milliGRAM(s) Oral before breakfast    MEDICATIONS  (PRN):  benzocaine/menthol Lozenge 1 Lozenge Oral two times a day PRN Sore Throat      ALLERGIES:  Allergies    No Known Allergies    Intolerances        LABS:                        11.6   5.74  )-----------( 204      ( 24 Mar 2024 09:36 )             35.6     03-24    132<L>  |  97  |  22  ----------------------------<  97  4.0   |  25  |  0.85    Ca    9.6      24 Mar 2024 09:36    TPro  6.7  /  Alb  4.0  /  TBili  1.0  /  DBili  x   /  AST  42<H>  /  ALT  24  /  AlkPhos  52  03-24      Urinalysis Basic - ( 24 Mar 2024 09:36 )    Color: Yellow / Appearance: Cloudy / S.020 / pH: x  Gluc: 97 mg/dL / Ketone: 15 mg/dL  / Bili: Negative / Urobili: 1.0 mg/dL   Blood: x / Protein: Trace mg/dL / Nitrite: Negative   Leuk Esterase: Trace / RBC: 6 /HPF / WBC 1 /HPF   Sq Epi: x / Non Sq Epi: 0 /HPF / Bacteria: Negative /HPF      CAPILLARY BLOOD GLUCOSE      POCT Blood Glucose.: 101 mg/dL (24 Mar 2024 08:48)      RADIOLOGY & ADDITIONAL TESTS: Reviewed.

## 2024-03-25 NOTE — DIETITIAN INITIAL EVALUATION ADULT - ENERGY INTAKE
Fair (50-75%) Pt is tolerating current diet: Regular. Endorses consuming 50-75% of breakfast this morning. States that he has an appetite while in-patient and will be trying to consume more for lunch and dinner tonight.

## 2024-03-25 NOTE — PROGRESS NOTE ADULT - PROBLEM SELECTOR PLAN 6
Hx of LBBB. follows cardiologist outpatient. Takes metoprolol 50mg daily  - continue metoprolol 50mg daily with hold parameters Hx of LBBB. follows cardiologist outpatient.   - f/u Cardiology outpatient

## 2024-03-25 NOTE — DIETITIAN INITIAL EVALUATION ADULT - ADD RECOMMEND
1. Continue current diet as tolerated: Regular. Defer diet texture/fluid consistencies to team.   2. Encourage PO intake and honor food preferences as able unless otherwise contraindicated.   3. Monitor PO intake, diet tolerance, weight trends, labs, and skin integrity and bowel movement regularity.  4. RD remains available upon request and will follow-up per protocol.

## 2024-03-25 NOTE — PROGRESS NOTE ADULT - PROBLEM SELECTOR PLAN 5
Reports having history of vertigo numerous times per year and takes meclizine PRN with improvement. Meclizine given in ED however deferred ambulation in setting of recent fall    - meclizine 25mg q6h PRN Reports having history of vertigo numerous times per year and takes meclizine PRN with improvement. Meclizine given in ED however deferred ambulation in setting of recent fall  - meclizine 25mg q6h PRN

## 2024-03-25 NOTE — DIETITIAN INITIAL EVALUATION ADULT - NSFNSNUTRHOMESUPPLEMENTFT_GEN_A_CORE
States that he takes multivitamin and vitamin D at home. Reports no additional use of oral nutritional supplement.

## 2024-03-25 NOTE — DISCHARGE NOTE PROVIDER - PROVIDER TOKENS
PROVIDER:[TOKEN:[[id number]],SCHEDULEDAPPT:[03/28/2024],SCHEDULEDAPPTTIME:[04:30 PM],ESTABLISHEDPATIENT:[T]],PROVIDER:[TOKEN:[[id number]],FOLLOWUP:[Routine],ESTABLISHEDPATIENT:[T]]

## 2024-03-25 NOTE — PROGRESS NOTE ADULT - PROBLEM SELECTOR PLAN 2
Likely 2/2 COVID-19 infection. Pt reports imbalance which he attributes to being unsteady on feet which is likely 2/2 his COVID-19 infection    - management as above in fall Likely 2/2 COVID-19 infection. Pt reports imbalance which he attributes to being unsteady on feet which is likely 2/2 his COVID-19 infection  - management as above in fall

## 2024-03-25 NOTE — DISCHARGE NOTE PROVIDER - NSDCHHBASESERVICE_GEN_ALL_CORE
Spoke to patient's mother Megan (on HIP[employer]) regarding getting in touch with patient.      Physical therapy

## 2024-03-25 NOTE — DIETITIAN INITIAL EVALUATION ADULT - PROBLEM SELECTOR PLAN 8
Na 132 likely 2/2 poor PO intake. Received 1L NS in ED    - trend Na  - encourage PO intake  - obtain urine lytes if downtrending

## 2024-03-25 NOTE — DIETITIAN INITIAL EVALUATION ADULT - PERTINENT MEDS FT
MEDICATIONS  (STANDING):  atorvastatin 40 milliGRAM(s) Oral at bedtime  carbidopa/levodopa  25/100 1 Tablet(s) Oral three times a day  enoxaparin Injectable 40 milliGRAM(s) SubCutaneous every 24 hours  metoprolol succinate ER 50 milliGRAM(s) Oral daily  pantoprazole    Tablet 40 milliGRAM(s) Oral before breakfast    MEDICATIONS  (PRN):  benzocaine/menthol Lozenge 1 Lozenge Oral two times a day PRN Sore Throat

## 2024-03-25 NOTE — PROGRESS NOTE ADULT - PROBLEM SELECTOR PLAN 1
Episode of fall due to lower extremity weakness this morning when ambulating to bathroom. Likely in setting of COVID-19 infection. CT head without acute pathology and neurology exam without focal deficit    - encourage PO hydration  - orthostatic  - fall precaution  - TSH  - PT consult Episode of fall due to lower extremity weakness 3/24 AM when ambulating to bathroom. Likely in setting of COVID-19 infection. CT head without acute pathology and neurology exam without focal deficit    - encourage PO hydration  - orthostatic  - fall precaution  - TSH  - PT consult Episode of fall due to lower extremity weakness 3/24 AM when ambulating to bathroom. Denies syncope. Likely in setting of COVID-19 infection. CT head without acute pathology and neurology exam without focal deficit. TSH wnl. Orthostatic vitals wnl.   - fall precaution  - f/u PT recs

## 2024-03-25 NOTE — PHYSICAL THERAPY INITIAL EVALUATION ADULT - NSPTDISCHREC_GEN_A_CORE
If patient were to go home will benefit from home PT and continues assist from [facility]./Sub-acute Rehab

## 2024-03-25 NOTE — DIETITIAN INITIAL EVALUATION ADULT - PROBLEM SELECTOR PLAN 1
Episode of fall due to lower extremity weakness this morning when ambulating to bathroom. Likely in setting of COVID-19 infection. CT head without acute pathology and neurology exam without focal deficit    - encourage PO hydration  - orthostatic  - fall precaution  - TSH  - PT consult

## 2024-03-25 NOTE — PHYSICAL THERAPY INITIAL EVALUATION ADULT - DIAGNOSIS, PT EVAL
6B: Impaired Aerobic Capacity/Endurance Associated with Deconditioning . 5A: Primary Prevention/Risk Reduction for Loss of Balance and Falling

## 2024-03-25 NOTE — DIETITIAN INITIAL EVALUATION ADULT - PROBLEM SELECTOR PLAN 3
COVID-19 positive, xray with RUL infiltrate, breathing comfortably on room air.    - Goal o2 sat >94%  - incentive spirometer  - Onset of symptoms >72 hours and without respiratory symptoms so no need for monoclonal antibody and no steroids as pt not hypoxic

## 2024-03-25 NOTE — DIETITIAN INITIAL EVALUATION ADULT - NSPROEDAREADYLEARN_GEN_A_NUR
[facility]   MD SHIRAZ MARTIN MD RUORU WONG, PA    TEL:  OFFICE: [phone]  DR PEREIRA CELL: [phone]  MO SLATER CELL: [phone]  DR. SEPULVEDA CELL: [phone]    none

## 2024-03-25 NOTE — DISCHARGE NOTE PROVIDER - NSDCCPCAREPLAN_GEN_ALL_CORE_FT
PRINCIPAL DISCHARGE DIAGNOSIS  Diagnosis: Acute weakness  Assessment and Plan of Treatment: You presented to the hospital for increasing weakness causing a fall, which was likely due to infection with COVID-19 and decreased nutrition and hydration. Head imaging did not show any bleeding, and your EKG did not show any changes, reducing concern for a neurologic or cardiac cause. Continue to eat and hydrate well. If you notice any shortness of breath, chest pain, or weakness after discharge, you may return to the ED.      SECONDARY DISCHARGE DIAGNOSES  Diagnosis: Acute bronchitis due to COVID-19 virus  Assessment and Plan of Treatment: You were found to have COVID-19, a respiratory virus, upon admission. Because you did not have any symptoms and your vital signs, including oxygen saturation, were within normal range, you did not require any additional treatment such as steroids. Follow up with your primary care provider after being discharged from the hospital, and return to the ED if you have any severe shortness of breath or chest pain.     PRINCIPAL DISCHARGE DIAGNOSIS  Diagnosis: Hyponatremia  Assessment and Plan of Treatment: Your sodium decreased during your hospitalization requiring you to be transferred to the monitored unit of the hospital. We suspect the reason for this was due to excess of a hormone called ADH which increases the water absorption in your kidneys, thereby diluting your blood and lowering your blood sodium levels. You were given extra salty saline to increase your sodium levels, as well as a medication called Urena, which works to make the kidneys excrete more water thereby concentrating your blood sodium level. You are now medically optimized for discharge home and will continue taking your Urena daily. Please be sure to follow up with your primary care provider within 1 week of discharge for further management of your sodium.  To get your Urena powder, you can order it from [employer] or from [email or web address]. You will need to take Urena 15g per day.      SECONDARY DISCHARGE DIAGNOSES  Diagnosis: Acute bronchitis due to COVID-19 virus  Assessment and Plan of Treatment: You were found to have COVID-19, a respiratory virus, upon admission. Because you did not have any symptoms and your vital signs, including oxygen saturation, were within normal range, you did not require any additional treatment such as steroids. Follow up with your primary care provider after being discharged from the hospital, and return to the ED if you have any severe shortness of breath or chest pain.

## 2024-03-25 NOTE — PHYSICAL THERAPY INITIAL EVALUATION ADULT - ADDITIONAL COMMENTS
Pt lives alone in an elevator access apartment with no PRICILLA. Pt reports to be mostly indpt with ADLs and IADLs with assist from [facility] when needed. Pt reports to have [facility] services 5 days a week and 12Hr coverage. Pt reports to ambulate with RW at baseline.

## 2024-03-25 NOTE — DISCHARGE NOTE PROVIDER - NSDCCPTREATMENT_GEN_ALL_CORE_FT
PRINCIPAL PROCEDURE  Procedure: CT head wo con  Findings and Treatment:   < end of copied text >  INTERPRETATION:  CLINICAL INDICATIONS:  falls  COMPARISON: Head CT dated 11/25/2018  TECHNIQUE: Noncontrast CT of the head. Multiplanar reformations are   submitted.  FINDINGS:  There is periventricular and subcortical white matter hypodensity without   mass effect, nonspecific, likely representing moderate chronic   microvascular ischemic changes. There is no compelling evidence for an   acute transcortical infarction. There is no evidence of mass, mass   effect, midline shift or extra-axial fluid collection. The lateral   ventricles and cortical sulci are age-appropriate in size and   configuration. Status post right ocular globe banding. The patient is   status post bilateral ocular lens replacement surgery. The orbits,   mastoid air cells and visualized paranasal sinuses are unremarkable. The   calvarium is intact. Consider MRI as clinically warranted.  IMPRESSION:  Moderatechronic microvascular changes without evidence of   an acute transcortical infarction or hemorrhage.< from: CT Head No Cont (03.24.24 @ 15:15) >        SECONDARY PROCEDURE  Procedure: XR chest, 1 view  Findings and Treatment:   < end of copied text >  INTERPRETATION:  Clinical History: Infiltrate  Frontal examination chest demonstrates the heart to be within normal   limits in transverse diameter on interstitial changes.. Degenerative   changes thoracic spine. Calcification aortic knob. Right upper lobe   infiltrates  IMPRESSION: Right upper lobe infiltrates..< from: Xray Chest 1 View- PORTABLE-Urgent (Xray Chest 1 View- PORTABLE-Urgent .) (03.24.24 @ 09:56) >

## 2024-03-25 NOTE — DIETITIAN INITIAL EVALUATION ADULT - REASON
Nutrition focused physical exam deferred at this time as pt is eating well and reports stable weight. Observed with no overt signs and symptoms of muscle or fat wasting. Based on ASPEN guidelines, pt does not meet criteria for malnutrition.

## 2024-03-25 NOTE — DIETITIAN INITIAL EVALUATION ADULT - REASON FOR ADMISSION
ACUTE WEAKNESS; ACUTEBRONCHITIS DUE TO COVID  "83 year old male with PMHx Parkinson's disease, vertigo, GERD, LBBB presents from home after having a fall due to weakness and found to be positive for COVID-19 and admitted for further management. Respiratory exam unremarkable. No changes compared to prior EKG. CXR displaying RUL infiltrate. No acute infarct or hemorrhage on non-con CT. Will ambulate with PT."

## 2024-03-25 NOTE — PHYSICAL THERAPY INITIAL EVALUATION ADULT - PERTINENT HX OF CURRENT PROBLEM, REHAB EVAL
83 year old male with PMHx Parkinson's disease, vertigo, GERD, LBBB presents from home after having a fall. Pt reports fall was due to leg weakness and was not mechanical in nature and denies head trauma but did bump into furniture on his back. Reports was walking to kitchen this morning when he developed bilateral LE weakness and fell. Since thursday, pt reports having "allergy symptoms" with sneezing and runny nose and reports he has flares of his allergy occasionally, worse during spring/summer. Around the same time he noticed having imbalance which he attributes to the unsteadiness and weakness of his legs. No recent travel or sick contact but does have health aid who come and go. Denies headache, dizziness, fever, chills, chest pain, SOB, abd pain, dysuria or diarrhea.

## 2024-03-25 NOTE — DIETITIAN INITIAL EVALUATION ADULT - EDUCATION DIETARY MODIFICATIONS
Detail Level: Zone Educated/Discussed on ways to promote intake in setting of fair appetite. Reviewed nutrient dense snacks options on the menu. Pt receptive and verbalized understanding to education./(2) meets goals/outcomes/verbalization

## 2024-03-25 NOTE — DISCHARGE NOTE PROVIDER - HOSPITAL COURSE
#Discharge: do not delete    Patient is __ yo M/F with past medical history of _____ presented with _____, found to have _____ (one liner)    Hospital course (by problem):     Patient was discharged to: (home/[facility]/acute rehab/hospice, etc, and with what services – home health PT/RN? Home O2?)    New medications:   Changes to old medications:  Medications that were stopped:    Items to follow up as outpatient:         #Discharge: do not delete    Patient is 83M past medical history of Parkinson's disease, vertigo, LBBB, and GERD, presented after non-mechanical fall due to LE weakness found to be positive for COVID-19. Patient w/o respiratory symptoms, no new abnormalities on EKG and non-contrast CT head. Seen by PT, who recommended ___ and cleared for d/c.    Hospital course (by problem):   #Fall: Due to lower extremity weakness, likely in setting of COVID-19 infection. CT head without acute pathology and neurology exam without focal deficit  - encourage PO hydration  - orthostatic  - fall precaution  - TSH  - PT consult.    #Weakness: Likely 2/2 COVID-19 infection. Pt denies sx of vertigo  - management as above in fall.    #2019 novel coronavirus disease: COVID-19+, xray with RUL infiltrate, breathing comfortably on room air  - Goal o2 sat >94%  - incentive spirometer  - Onset of symptoms >72 hours and without respiratory symptoms so no need for monoclonal antibody and no steroids as pt not hypoxic.    #History of Parkinson's disease: Follows neurologist Dr. Berlin Sanchez  - continue carbidopa-levodopa  TID    #?HTN  Elevated blood pressures, SBP 150s, suspect history of HTN as patient on metoprolol (pt is unsure why he is on it)  - continue metoprolol 50mg daily with hold parameters.    #Vertigo: History of vertigo numerous times per year and takes meclizine PRN with improvement. Meclizine given in ED however deferred ambulation in setting of recent fall  - meclizine 25mg q6h PRN.    #Left bundle branch block: Follows cardiologist outpatient. Takes metoprolol 50mg daily  - continue metoprolol 50mg daily with hold parameters.    #GERD: Takes omeprazole 40mg daily  - continue pantoprazole 40mg qd    #Hyponatremia: Na 132 on admission, likely 2/2 poor PO intake. Received 1L NS in ED  - continue trending Na, currently stable  - encourage PO intake  - obtain urine lytes if downtrending.    #Anemia: Hb 11.6 on admission, no active signs of bleed  - transfuse for Hb <7  - maintain active T&S.    Patient was discharged to: home with home health and PT    New medications:   Changes to old medications:  Medications that were stopped:    Items to follow up as outpatient:  - f/u PCP         #Discharge: do not delete    Patient is 83M past medical history of Parkinson's disease, vertigo, LBBB, and GERD, presented after non-mechanical fall due to LE weakness found to be positive for COVID-19. Patient w/o respiratory symptoms, no new abnormalities on EKG and non-contrast CT head. Seen by PT, who recommended ___ and cleared for d/c.    Hospital course (by problem):   #Fall: Due to lower extremity weakness, likely in setting of COVID-19 infection. CT head without acute pathology and neurology exam without focal deficit  - encourage PO hydration  - orthostatic  - fall precaution  - TSH  - PT consult.    #Weakness: Likely 2/2 COVID-19 infection. Pt denies sx of vertigo  - management as above in fall.    #2019 novel coronavirus disease: COVID-19+, xray with RUL infiltrate, breathing comfortably on room air  - Goal o2 sat >94%  - incentive spirometer  - Onset of symptoms >72 hours and without respiratory symptoms so no need for monoclonal antibody and no steroids as pt not hypoxic.    #History of Parkinson's disease: Follows neurologist Dr. Berlin Sanchez  - continue carbidopa-levodopa  TID    #?HTN  Elevated blood pressures, SBP 150s, suspect history of HTN as patient on metoprolol (pt is unsure why he is on it)  - continue metoprolol 50mg daily with hold parameters.    #Vertigo: History of vertigo numerous times per year and takes meclizine PRN with improvement. Meclizine given in ED however deferred ambulation in setting of recent fall  - meclizine 25mg q6h PRN.    #Left bundle branch block: Follows cardiologist outpatient. Takes metoprolol 50mg daily  - continue metoprolol 50mg daily with hold parameters.    #GERD: Takes omeprazole 40mg daily  - continue pantoprazole 40mg qd    #Hyponatremia: Na 132 on admission, likely 2/2 poor PO intake. Received 1L NS in ED  - continue trending Na, currently stable  - encourage PO intake  - obtain urine lytes if downtrending.    #Anemia: Hb 11.6 on admission, no active signs of bleed  - transfuse for Hb <7  - maintain active T&S.    Patient was discharged to: home with home health (has [facility] daily except for M and evenings) and PT    New medications: N/A  Changes to old medications: N/A  Medications that were stopped: N/A    Items to follow up as outpatient:  - f/u PCP Spike Beatty         #Discharge: do not delete    Patient is 83M past medical history of Parkinson's disease, vertigo, LBBB, and GERD, presented after non-mechanical fall due to LE weakness found to be positive for COVID-19. Patient w/o respiratory symptoms, no new abnormalities on EKG and infarcts on non-contrast CT head. Seen by PT, who recommended ___ and medically cleared for d/c.    Hospital course (by problem):   #Fall: Due to lower extremity weakness, likely in setting of COVID-19 infection. CT head without acute pathology and neurology exam without focal deficit  - encourage PO hydration  - orthostatic  - fall precaution  - TSH  - PT consult.    #Weakness: Likely 2/2 COVID-19 infection. Pt denies sx of vertigo  - management as above in fall.    #2019 novel coronavirus disease: COVID-19+, xray with RUL infiltrate, breathing comfortably on room air  - Goal o2 sat >94%  - incentive spirometer  - Onset of symptoms >72 hours and without respiratory symptoms so no need for monoclonal antibody and no steroids as pt not hypoxic.    #History of Parkinson's disease: Follows neurologist Dr. Berlin Sanchez  - continue carbidopa-levodopa  TID    #?HTN  Elevated blood pressures, SBP 150s, suspect history of HTN as patient on metoprolol (pt is unsure why he is on it)  - continue metoprolol 50mg daily with hold parameters.    #Vertigo: History of vertigo numerous times per year and takes meclizine PRN with improvement. Meclizine given in ED however deferred ambulation in setting of recent fall  - meclizine 25mg q6h PRN.    #Left bundle branch block: Follows cardiologist outpatient. Takes metoprolol 50mg daily  - continue metoprolol 50mg daily with hold parameters.    #GERD: Takes omeprazole 40mg daily  - continue pantoprazole 40mg qd    #Hyponatremia: Na 132 on admission, likely 2/2 poor PO intake. Received 1L NS in ED  - continue trending Na, currently stable  - encourage PO intake  - obtain urine lytes if downtrending.    #Anemia: Hb 11.6 on admission, no active signs of bleed  - transfuse for Hb <7  - maintain active T&S.    Patient was discharged to: home with home health (has [facility] daily except for M and evenings) and PT    New medications: N/A  Changes to old medications: N/A  Medications that were stopped: N/A    Items to follow up as outpatient:  - f/u PCP Spike Beatty         #Discharge: do not delete    Patient is 83M past medical history of Parkinson's disease, vertigo, LBBB, and GERD, presented after non-mechanical fall due to LE weakness found to be positive for COVID-19. Patient w/o respiratory symptoms, no new abnormalities on EKG and infarcts on non-contrast CT head. Seen by PT, who recommended ___ and medically cleared for d/c.    Hospital course (by problem):   #Fall: Due to lower extremity weakness, likely in setting of COVID-19 infection. CT head without acute pathology and neurology exam without focal deficit. Orthostatics negative. TSH wnl.   - encourage PO hydration  - PT recs:     #Weakness: Likely 2/2 COVID-19 infection. Pt denies current sx of vertigo. Patient states that current symptoms are not consistent with his usual Parkinson's symptoms. Orthostatics negative.   - management as above in fall.    #2019 novel coronavirus disease: COVID-19+, xray with RUL infiltrate, breathing comfortably on room air  - Onset of symptoms >72 hours and without respiratory symptoms so no need for monoclonal antibody and no steroids as pt not hypoxic.    #History of Parkinson's disease: Follows neurologist Dr. Berlin Sanchez  - continue carbidopa-levodopa  TID    #HTN  Elevated blood pressures, SBP 150s. Patient states he was on Lisinopril and Metoprolol, however Lisinopril was discontinued since BPs were well controlled. Pt is unsure why he is still on Metoprolol  - continue metoprolol 50mg daily  - f/u PCP    #Vertigo: History of vertigo numerous times per year and takes meclizine PRN with improvement. Meclizine given in ED however deferred ambulation in setting of recent fall  - meclizine 25mg q6h PRN.    #Left bundle branch block: Follows cardiologist outpatient. Takes metoprolol 50mg daily  - continue metoprolol 50mg daily with hold parameters.    #GERD: Takes omeprazole 40mg daily  - continue pantoprazole 40mg qd    #Hyponatremia: Na 132 on admission, likely 2/2 poor PO intake. Received 1L NS in ED  - encourage PO intake    #Anemia: Hb 11.6 on admission, no active signs of bleed    Patient was discharged to: home with home health (has [facility] daily except for M and evenings) and PT    New medications: N/A  Changes to old medications: N/A  Medications that were stopped: N/A    Items to follow up as outpatient:  - f/u PCP Spike Beatty         #Discharge: do not delete    Patient is 83M past medical history of Parkinson's disease, vertigo, LBBB, and GERD, presented after non-mechanical fall due to LE weakness found to be positive for COVID-19. Patient w/o respiratory symptoms, no new abnormalities on EKG and infarcts on non-contrast CT head. Seen by PT, who recommended ___ and medically stable for d/c.    Hospital course (by problem):   #Fall: Due to lower extremity weakness, likely in setting of COVID-19 infection. Denies syncope or head strike. CT head without acute pathology and neurology exam without focal deficit. Orthostatics negative. TSH wnl.   - encourage PO hydration  - PT recs:     #Weakness: Likely 2/2 COVID-19 infection. Pt denies current sx of vertigo. Patient states that current symptoms are not consistent with his usual Parkinson's symptoms. Orthostatics negative.   - management as above in fall.    #2019 novel coronavirus disease: COVID-19+, xray with RUL infiltrate, breathing comfortably on room air  - Onset of symptoms >72 hours and without respiratory symptoms so no need for monoclonal antibody and no steroids as pt not hypoxic.    #History of Parkinson's disease: Follows neurologist Dr. Berlin Sanchez  - continue carbidopa-levodopa  TID    #HTN  Elevated blood pressures, SBP 150s. Patient states he was on Lisinopril and Metoprolol, however Lisinopril was discontinued since BPs were well controlled. Pt is unsure why he is still on Metoprolol  - continue metoprolol 50mg daily  - f/u PCP    #Vertigo: History of vertigo numerous times per year and takes meclizine PRN with improvement. Meclizine given in ED however deferred ambulation in setting of recent fall  - meclizine 25mg q6h PRN.    #Left bundle branch block: Follows cardiologist outpatient. Takes metoprolol 50mg daily  - continue metoprolol 50mg daily with hold parameters.    #GERD: Takes omeprazole 40mg daily  - continue pantoprazole 40mg qd    #Hyponatremia: Na 132 on admission, likely 2/2 poor PO intake. Received 1L NS in ED  - encourage PO intake    #Anemia: Hb 11.6 on admission, no active signs of bleed    Patient was discharged to: home with home health (has [facility] daily except for M and evenings) and PT    New medications: N/A  Changes to old medications: N/A  Medications that were stopped: N/A    Items to follow up as outpatient:  - f/u PCP Spike Beatty         #Discharge: do not delete    Patient is 83M past medical history of Parkinson's disease, vertigo, LBBB, and GERD, presented after non-mechanical fall due to LE weakness found to be positive for COVID-19. Patient w/o respiratory symptoms, no new abnormalities on EKG and infarcts on non-contrast CT head. Seen by PT, who recommended ___ and medically stable for d/c.    Hospital course (by problem):   #Fall: Due to lower extremity weakness, likely in setting of COVID-19 infection. Denies syncope or head strike. CT head without acute pathology and neurology exam without focal deficit. Orthostatics negative. TSH wnl.   - encourage PO hydration  - PT recs:     #Weakness: Likely 2/2 COVID-19 infection. Pt denies current sx of vertigo. Patient states that current symptoms are not consistent with his usual Parkinson's symptoms. Orthostatics negative.   - management as above in fall.    #2019 novel coronavirus disease: COVID-19+, xray with RUL infiltrate, breathing comfortably on room air  - Onset of symptoms >72 hours and without respiratory symptoms so no need for monoclonal antibody and no steroids as pt not hypoxic.    #History of Parkinson's disease: Follows neurologist Dr. Berlin Sanchez  - continue carbidopa-levodopa  TID    #HTN  Elevated blood pressures, SBP 150s. Patient states he was on Lisinopril and Metoprolol, however Lisinopril was discontinued since BPs were well controlled. Pt is unsure why he is still on Metoprolol  - continue metoprolol 50mg daily  - f/u PCP    #Vertigo: History of vertigo numerous times per year and takes meclizine PRN with improvement. Meclizine given in ED however deferred ambulation in setting of recent fall  - meclizine 25mg q6h PRN.    #Left bundle branch block: Follows cardiologist outpatient. Takes metoprolol 50mg daily  - continue metoprolol 50mg daily with hold parameters.    #GERD: Takes omeprazole 40mg daily  - continue pantoprazole 40mg qd    #Hyponatremia: Na 132 on admission, which patient states is his baseline. Received 1L NS in ED. Na 123, repeat 120 on 3/26 AM. 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  - encourage PO intake    #Anemia: Hb 11.6 on admission, no active signs of bleed    Patient was discharged to: home with home health (has [facility] daily except for M and evenings) and PT    New medications: N/A  Changes to old medications: N/A  Medications that were stopped: N/A    Items to follow up as outpatient:  - f/u PCP Spike Beatty         #Discharge: do not delete    Patient is 83M past medical history of Parkinson's disease, vertigo, LBBB, and GERD, presented after non-mechanical fall due to LE weakness found to be positive for COVID-19. Patient w/o respiratory symptoms, no new abnormalities on EKG and infarcts on non-contrast CT head. Seen by PT, who recommended HPT. C/c/b hyponatremia requiring transfer to telemetry unit, s/p hypertonic saline, urea tabs and fluid restriction. Now medically optimized for d/c.    Hospital course (by problem):   #Fall: Due to lower extremity weakness, likely in setting of COVID-19 infection. Denies syncope or head strike. CT head without acute pathology and neurology exam without focal deficit. Orthostatics negative. TSH wnl.   - encourage PO hydration  - PT recs: HPT  - OT recs: HOT    #Hyponatremia: Na 132 on admission, which patient states is his baseline. Received 1L NS in ED. Na 123, repeat 120 on 3/26 AM. 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 Hyponatremia worsened <120 requiring transfer to telemetry unit. S/p hypertonic saline, fluid restriction, and urea tabs. Now stable at 132.   - Continue urea 15g qd  - C/w fluid restriction 1.2L  - F/u with outpatient renal    #Weakness: Likely 2/2 COVID-19 infection. Pt denies current sx of vertigo. Patient states that current symptoms are not consistent with his usual Parkinson's symptoms. Orthostatics negative.   - management as above in fall.    #2019 novel coronavirus disease: COVID-19+, xray with RUL infiltrate, breathing comfortably on room air  - Onset of symptoms >72 hours and without respiratory symptoms so no need for monoclonal antibody and no steroids as pt not hypoxic.    #History of Parkinson's disease: Follows neurologist Dr. Berlin Sanchez  - continue carbidopa-levodopa  TID    #HTN  Elevated blood pressures, SBP 150s. Patient states he was on Lisinopril and Metoprolol, however Lisinopril was discontinued since BPs were well controlled. Pt is unsure why he is still on Metoprolol  - continue metoprolol 50mg daily  - f/u PCP    #Vertigo: History of vertigo numerous times per year and takes meclizine PRN with improvement. Meclizine given in ED however deferred ambulation in setting of recent fall  - meclizine 25mg q6h PRN.    #Left bundle branch block: Follows cardiologist outpatient. Takes metoprolol 50mg daily  - continue metoprolol 50mg daily with hold parameters.    #GERD: Takes omeprazole 40mg daily  - continue pantoprazole 40mg qd    #Anemia: Hb 11.6 on admission, no active signs of bleed    Patient was discharged to: home with home health (has HHA daily except for M and evenings) and PT    New medications: Urena   Changes to old medications: N/A  Medications that were stopped: N/A    Items to follow up as outpatient: Hyponatremia, Falls   - f/u PCP Spike Beatty

## 2024-03-25 NOTE — DISCHARGE NOTE PROVIDER - NSDCMRMEDTOKEN_GEN_ALL_CORE_FT
carbidopa-levodopa 25 mg-100 mg oral tablet: 1 tab(s) orally 3 times a day  metoprolol succinate 50 mg oral tablet, extended release: 1 tab(s) orally once a day  omeprazole 40 mg oral delayed release capsule: 1 cap(s) orally once a day  rosuvastatin 10 mg oral capsule: 1 cap(s) orally once a day (at bedtime)   carbidopa-levodopa 25 mg-100 mg oral tablet: 1 tab(s) orally 3 times a day  metoprolol succinate 50 mg oral tablet, extended release: 1 tab(s) orally once a day  omeprazole 40 mg oral delayed release capsule: 1 cap(s) orally once a day  rosuvastatin 10 mg oral capsule: 1 cap(s) orally once a day (at bedtime)  urea 15 g oral powder for reconstitution: 1 packet(s) orally once a day   carbidopa-levodopa 25 mg-100 mg oral tablet: 1 tab(s) orally 3 times a day  Home Occupation Therapy: Home occupational therapy  Home Physical Therapy: Please attend  metoprolol succinate 50 mg oral tablet, extended release: 1 tab(s) orally once a day  omeprazole 40 mg oral delayed release capsule: 1 cap(s) orally once a day  rosuvastatin 10 mg oral capsule: 1 cap(s) orally once a day (at bedtime)  ure-Na 15 g oral powder for reconstitution: 3 packet(s) orally once a day  urea 15 g oral powder for reconstitution: 1 packet(s) orally once a day

## 2024-03-25 NOTE — DISCHARGE NOTE PROVIDER - CARE PROVIDER_API CALL
TRIXIE LYON, RAY [address]  [place] [address]  Phone: [phone]  Fax: [phone]  Established Patient  Scheduled Appointment: 03/28/2024 04:30 PM    LAURA JOINER  [address] [place] [address]  Phone: [phone]  Fax: [phone]  Established Patient  Follow Up Time: Routine

## 2024-03-25 NOTE — DIETITIAN INITIAL EVALUATION ADULT - OTHER CALCULATIONS
Based on Standards of Care pt within % IBW (98%) thus actual body weight (68.4kg) used for all calculations. Needs adjusted for advanced age. Fluid recs per team.

## 2024-03-25 NOTE — DIETITIAN INITIAL EVALUATION ADULT - ORAL INTAKE PTA/DIET HISTORY
Visited pt at bedside on 9UR. States that at home he typically consumes 3 meals a day at baseline. Does not follow any therapeutic diet at home, but states that he tries to consume more fruits and veggies and lean proteins. No cultural, ethnic, Spiritism food preferences noted. Confirms No known food allergies.

## 2024-03-25 NOTE — PROGRESS NOTE ADULT - PROBLEM SELECTOR PLAN 9
Hb 11.6, no active signs of bleed    - transfuse for Hb <7  - maintain active T&S Hb 11.6, no active signs of bleed  - transfuse for Hb <7  - maintain active T&S

## 2024-03-25 NOTE — DIETITIAN INITIAL EVALUATION ADULT - NSFNSGIASSESSMENTFT_GEN_A_CORE
No issues with nausea, vomiting, diarrhea reported at time of visit. States that he is experiencing constipation. Last BM noted on 3/24 per pt. Pt is not currently on a bowel regimen.

## 2024-03-25 NOTE — PROGRESS NOTE ADULT - PROBLEM SELECTOR PLAN 3
COVID-19 positive, xray with RUL infiltrate, breathing comfortably on room air.    - Goal o2 sat >94%  - incentive spirometer  - Onset of symptoms >72 hours and without respiratory symptoms so no need for monoclonal antibody and no steroids as pt not hypoxic COVID-19 positive, CXR shows RUL infiltrate, breathing comfortably on room air.  - Goal o2 sat >94%  - incentive spirometer  - Onset of symptoms >72 hours and without respiratory symptoms so no need for monoclonal antibody and no steroids as pt not hypoxic

## 2024-03-25 NOTE — DIETITIAN INITIAL EVALUATION ADULT - PROBLEM SELECTOR PLAN 4
On carbidopa-levodopa  TID. Follows neurologist Dr. Berlin Sanchez    - continue carbidopa-levodopa  TID    #?HTN  Elevated blood pressures, SBP 150s, suspect history of HTN as patient on metoprolol (pt is unsure why he is on it)  - continue metoprolol 50mg daily with hold parameters

## 2024-03-25 NOTE — DIETITIAN INITIAL EVALUATION ADULT - REASON INDICATOR FOR ASSESSMENT
Nutrition consult warranted for: MST score 2 or >   Information obtained from: electronic medical record and patient  Chart reviewed, events noted.

## 2024-03-26 DIAGNOSIS — I10 ESSENTIAL (PRIMARY) HYPERTENSION: ICD-10-CM

## 2024-03-26 DIAGNOSIS — E22.2 SYNDROME OF INAPPROPRIATE SECRETION OF ANTIDIURETIC HORMONE: ICD-10-CM

## 2024-03-26 LAB
ANION GAP SERPL CALC-SCNC: 11 MMOL/L — SIGNIFICANT CHANGE UP (ref 5–17)
ANION GAP SERPL CALC-SCNC: 4 MMOL/L — LOW (ref 5–17)
ANION GAP SERPL CALC-SCNC: 9 MMOL/L — SIGNIFICANT CHANGE UP (ref 5–17)
ANION GAP SERPL CALC-SCNC: 9 MMOL/L — SIGNIFICANT CHANGE UP (ref 5–17)
ANION GAP SERPL CALC-SCNC: SIGNIFICANT CHANGE UP MMOL/L (ref 5–17)
BUN SERPL-MCNC: 10 MG/DL — SIGNIFICANT CHANGE UP (ref 7–23)
BUN SERPL-MCNC: 12 MG/DL — SIGNIFICANT CHANGE UP (ref 7–23)
BUN SERPL-MCNC: 13 MG/DL — SIGNIFICANT CHANGE UP (ref 7–23)
BUN SERPL-MCNC: 9 MG/DL — SIGNIFICANT CHANGE UP (ref 7–23)
BUN SERPL-MCNC: SIGNIFICANT CHANGE UP (ref 7–23)
CALCIUM SERPL-MCNC: 8.4 MG/DL — SIGNIFICANT CHANGE UP (ref 8.4–10.5)
CALCIUM SERPL-MCNC: 8.7 MG/DL — SIGNIFICANT CHANGE UP (ref 8.4–10.5)
CALCIUM SERPL-MCNC: 8.8 MG/DL — SIGNIFICANT CHANGE UP (ref 8.4–10.5)
CALCIUM SERPL-MCNC: 9 MG/DL — SIGNIFICANT CHANGE UP (ref 8.4–10.5)
CALCIUM SERPL-MCNC: SIGNIFICANT CHANGE UP (ref 8.4–10.5)
CHLORIDE SERPL-SCNC: 87 MMOL/L — LOW (ref 96–108)
CHLORIDE SERPL-SCNC: 88 MMOL/L — LOW (ref 96–108)
CHLORIDE SERPL-SCNC: 88 MMOL/L — LOW (ref 96–108)
CHLORIDE SERPL-SCNC: 89 MMOL/L — LOW (ref 96–108)
CHLORIDE SERPL-SCNC: SIGNIFICANT CHANGE UP (ref 96–108)
CO2 SERPL-SCNC: 20 MMOL/L — LOW (ref 22–31)
CO2 SERPL-SCNC: 21 MMOL/L — LOW (ref 22–31)
CO2 SERPL-SCNC: 25 MMOL/L — SIGNIFICANT CHANGE UP (ref 22–31)
CO2 SERPL-SCNC: 28 MMOL/L — SIGNIFICANT CHANGE UP (ref 22–31)
CO2 SERPL-SCNC: SIGNIFICANT CHANGE UP (ref 22–31)
CREAT SERPL-MCNC: 0.53 MG/DL — SIGNIFICANT CHANGE UP (ref 0.5–1.3)
CREAT SERPL-MCNC: 0.57 MG/DL — SIGNIFICANT CHANGE UP (ref 0.5–1.3)
CREAT SERPL-MCNC: 0.69 MG/DL — SIGNIFICANT CHANGE UP (ref 0.5–1.3)
CREAT SERPL-MCNC: 0.7 MG/DL — SIGNIFICANT CHANGE UP (ref 0.5–1.3)
CREAT SERPL-MCNC: SIGNIFICANT CHANGE UP MG/DL (ref 0.5–1.3)
EGFR: 91 ML/MIN/1.73M2 — SIGNIFICANT CHANGE UP
EGFR: 91 ML/MIN/1.73M2 — SIGNIFICANT CHANGE UP
EGFR: 97 ML/MIN/1.73M2 — SIGNIFICANT CHANGE UP
EGFR: 99 ML/MIN/1.73M2 — SIGNIFICANT CHANGE UP
EGFR: SIGNIFICANT CHANGE UP ML/MIN/1.73M2
GLUCOSE SERPL-MCNC: 100 MG/DL — HIGH (ref 70–99)
GLUCOSE SERPL-MCNC: 110 MG/DL — HIGH (ref 70–99)
GLUCOSE SERPL-MCNC: 119 MG/DL — HIGH (ref 70–99)
GLUCOSE SERPL-MCNC: 133 MG/DL — HIGH (ref 70–99)
GLUCOSE SERPL-MCNC: SIGNIFICANT CHANGE UP (ref 70–99)
HCT VFR BLD CALC: 39.3 % — SIGNIFICANT CHANGE UP (ref 39–50)
HGB BLD-MCNC: 13.2 G/DL — SIGNIFICANT CHANGE UP (ref 13–17)
MAGNESIUM SERPL-MCNC: 1.7 MG/DL — SIGNIFICANT CHANGE UP (ref 1.6–2.6)
MCHC RBC-ENTMCNC: 30.1 PG — SIGNIFICANT CHANGE UP (ref 27–34)
MCHC RBC-ENTMCNC: 33.6 GM/DL — SIGNIFICANT CHANGE UP (ref 32–36)
MCV RBC AUTO: 89.5 FL — SIGNIFICANT CHANGE UP (ref 80–100)
NRBC # BLD: 0 /100 WBCS — SIGNIFICANT CHANGE UP (ref 0–0)
OSMOLALITY SERPL: 252 MOSM/KG — LOW (ref 280–301)
OSMOLALITY SERPL: 253 MOSM/KG — LOW (ref 280–301)
OSMOLALITY UR: 512 MOSM/KG — SIGNIFICANT CHANGE UP (ref 300–900)
OSMOLALITY UR: 658 MOSM/KG — SIGNIFICANT CHANGE UP (ref 300–900)
PHOSPHATE SERPL-MCNC: 3 MG/DL — SIGNIFICANT CHANGE UP (ref 2.5–4.5)
PLATELET # BLD AUTO: 194 K/UL — SIGNIFICANT CHANGE UP (ref 150–400)
POTASSIUM SERPL-MCNC: 3.9 MMOL/L — SIGNIFICANT CHANGE UP (ref 3.5–5.3)
POTASSIUM SERPL-MCNC: 3.9 MMOL/L — SIGNIFICANT CHANGE UP (ref 3.5–5.3)
POTASSIUM SERPL-MCNC: 4.1 MMOL/L — SIGNIFICANT CHANGE UP (ref 3.5–5.3)
POTASSIUM SERPL-MCNC: 4.2 MMOL/L — SIGNIFICANT CHANGE UP (ref 3.5–5.3)
POTASSIUM SERPL-MCNC: 4.2 MMOL/L — SIGNIFICANT CHANGE UP (ref 3.5–5.3)
POTASSIUM SERPL-SCNC: 3.9 MMOL/L — SIGNIFICANT CHANGE UP (ref 3.5–5.3)
POTASSIUM SERPL-SCNC: 3.9 MMOL/L — SIGNIFICANT CHANGE UP (ref 3.5–5.3)
POTASSIUM SERPL-SCNC: 4.1 MMOL/L — SIGNIFICANT CHANGE UP (ref 3.5–5.3)
POTASSIUM SERPL-SCNC: 4.2 MMOL/L — SIGNIFICANT CHANGE UP (ref 3.5–5.3)
POTASSIUM SERPL-SCNC: 4.2 MMOL/L — SIGNIFICANT CHANGE UP (ref 3.5–5.3)
RBC # BLD: 4.39 M/UL — SIGNIFICANT CHANGE UP (ref 4.2–5.8)
RBC # FLD: 12.5 % — SIGNIFICANT CHANGE UP (ref 10.3–14.5)
SODIUM SERPL-SCNC: 118 MMOL/L — CRITICAL LOW (ref 135–145)
SODIUM SERPL-SCNC: 118 MMOL/L — CRITICAL LOW (ref 135–145)
SODIUM SERPL-SCNC: 120 MMOL/L — CRITICAL LOW (ref 135–145)
SODIUM SERPL-SCNC: 123 MMOL/L — LOW (ref 135–145)
SODIUM SERPL-SCNC: SIGNIFICANT CHANGE UP (ref 135–145)
SODIUM UR-SCNC: 147 MMOL/L — SIGNIFICANT CHANGE UP
SODIUM UR-SCNC: 168 MMOL/L — SIGNIFICANT CHANGE UP
WBC # BLD: 5.33 K/UL — SIGNIFICANT CHANGE UP (ref 3.8–10.5)
WBC # FLD AUTO: 5.33 K/UL — SIGNIFICANT CHANGE UP (ref 3.8–10.5)

## 2024-03-26 PROCEDURE — 99232 SBSQ HOSP IP/OBS MODERATE 35: CPT | Mod: GC

## 2024-03-26 PROCEDURE — 99232 SBSQ HOSP IP/OBS MODERATE 35: CPT

## 2024-03-26 RX ORDER — NIFEDIPINE 30 MG
30 TABLET, EXTENDED RELEASE 24 HR ORAL ONCE
Refills: 0 | Status: COMPLETED | OUTPATIENT
Start: 2024-03-26 | End: 2024-03-26

## 2024-03-26 RX ORDER — SODIUM CHLORIDE 5 G/100ML
500 INJECTION, SOLUTION INTRAVENOUS
Refills: 0 | Status: DISCONTINUED | OUTPATIENT
Start: 2024-03-26 | End: 2024-03-28

## 2024-03-26 RX ORDER — CHLORHEXIDINE GLUCONATE 213 G/1000ML
1 SOLUTION TOPICAL DAILY
Refills: 0 | Status: DISCONTINUED | OUTPATIENT
Start: 2024-03-26 | End: 2024-03-30

## 2024-03-26 RX ADMIN — CARBIDOPA AND LEVODOPA 1 TABLET(S): 25; 100 TABLET ORAL at 21:09

## 2024-03-26 RX ADMIN — PANTOPRAZOLE SODIUM 40 MILLIGRAM(S): 20 TABLET, DELAYED RELEASE ORAL at 05:47

## 2024-03-26 RX ADMIN — Medication 30 MILLIGRAM(S): at 17:09

## 2024-03-26 RX ADMIN — CHLORHEXIDINE GLUCONATE 1 APPLICATION(S): 213 SOLUTION TOPICAL at 11:21

## 2024-03-26 RX ADMIN — Medication 50 MILLIGRAM(S): at 05:47

## 2024-03-26 RX ADMIN — CARBIDOPA AND LEVODOPA 1 TABLET(S): 25; 100 TABLET ORAL at 14:46

## 2024-03-26 RX ADMIN — SODIUM CHLORIDE 50 MILLILITER(S): 5 INJECTION, SOLUTION INTRAVENOUS at 23:20

## 2024-03-26 RX ADMIN — ATORVASTATIN CALCIUM 40 MILLIGRAM(S): 80 TABLET, FILM COATED ORAL at 21:09

## 2024-03-26 RX ADMIN — CARBIDOPA AND LEVODOPA 1 TABLET(S): 25; 100 TABLET ORAL at 05:47

## 2024-03-26 RX ADMIN — ENOXAPARIN SODIUM 40 MILLIGRAM(S): 100 INJECTION SUBCUTANEOUS at 21:09

## 2024-03-26 NOTE — CONSULT NOTE ADULT - ASSESSMENT
Mr. 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     - Transfer to [facility].  Mr. 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

## 2024-03-26 NOTE — OCCUPATIONAL THERAPY INITIAL EVALUATION ADULT - DIAGNOSIS, OT EVAL
Pt. admitted to [facility] s/p mechanical fall as well as dx of COVID-19. Upon assessment, pt. demo generalized deconditoning as well as deficits in balance and postural control impacting engagement in ADLs and functional mob/transfers.

## 2024-03-26 NOTE — CONSULT NOTE ADULT - PROBLEM SELECTOR RECOMMENDATION 6
Reports having history of vertigo numerous times per year and takes meclizine PRN with improvement. Meclizine given in ED however deferred ambulation in setting of recent fall. Continue to monitor for symptoms, possibly indicating worsening hyponatremia.   - Continue Meclizine 25mg q6h PRN.

## 2024-03-26 NOTE — PROGRESS NOTE ADULT - PROBLEM SELECTOR PLAN 5
On carbidopa-levodopa  TID. Follows neurologist Dr. Berlin Sanchez    - Continue carbidopa-levodopa  TID

## 2024-03-26 NOTE — PROGRESS NOTE ADULT - PROBLEM SELECTOR PLAN 8
Na 132 likely 2/2 poor PO intake. Received 1L NS in ED  - trend Na  - encourage PO intake  - obtain urine lytes if downtrending Patient reports chronically being in lower 130s, Na 132 in ED, received 1L NS. 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  - trend Na q6h (16:00 and 22:00)  - encourage PO intake  - urine osmolality  - if above c/w SIADH, start fluid restriction (1L/d) and salt tablets PO Takes omeprazole 40mg daily  - continue pantoprazole

## 2024-03-26 NOTE — PROGRESS NOTE ADULT - PROBLEM SELECTOR PLAN 4
Elevated blood pressures on admission with SBP 150s. States he was on Lisinopril and Metoprolol, however Lisinopril was discontinued. Pt is unsure why he is still on Metoprolol  - S/p nifedipine 30 mg XL     - Continue metoprolol 50mg daily

## 2024-03-26 NOTE — PROGRESS NOTE ADULT - PROBLEM SELECTOR PLAN 7
verbalizes understanding Takes omeprazole 40mg daily  - continue pantoprazole verbalizes understanding/demonstrates understanding of teaching/good return demonstration/Lactation team to follow up Assisted mother with deeper latch and position and  latched effectively with swallowing noted. Discussed lanolin cream to sore nipples after breastfeeding . Rn aware of plan../verbalizes understanding/demonstrates understanding of teaching/good return demonstration/Lactation team to follow up Hx of LBBB. follows cardiologist outpatient.   - f/u Cardiology outpatient

## 2024-03-26 NOTE — CONSULT NOTE ADULT - PROBLEM SELECTOR RECOMMENDATION 9
Acute hyponatremia from 132 to 118, elevated urine osmolality and urine sodium. Placed on a fluid restriction today with continued salt wasting. Nephrology consulted. Asymptomatic at this time.     - 3% saline infusion at 50ccs/hour per nephrology recs, goal sodium 132. Will require about 1L.   - Start fluid restriction to 1L.   - Continue q4 hour BMP, urine sodium, and urine osmolality.   - AM cortisol

## 2024-03-26 NOTE — OCCUPATIONAL THERAPY INITIAL EVALUATION ADULT - PERTINENT HX OF CURRENT PROBLEM, REHAB EVAL
83 year old male with PMHx Parkinson's disease, vertigo, GERD, LBBB presents from home after having a fall. Pt reports fall was due to leg weakness and was not mechanical in nature and denies head trauma but did bump into furniture on his back. Reports was walking to kitchen this morning when he developed bilateral LE weakness and fell. Since thursday, pt reports having "allergy symptoms" with sneezing and runny nose and reports he has flares of his allergy occasionally, worse during spring/summer. Around the same time he noticed having imbalance which he attributes to the unsteadiness and weakness of his legs. No recent travel or sick contact but does have health aid who come and go. Denies headache, dizziness, fever, chills, chest pain, SOB, abd pain, dysuria or diarrhea

## 2024-03-26 NOTE — OCCUPATIONAL THERAPY INITIAL EVALUATION ADULT - GENERAL OBSERVATIONS, REHAB EVAL
OT IE complete. Pt. received semi-supine in bed, +heplock on AIRBORNE in NAD, agreeable to therapy session.

## 2024-03-26 NOTE — PROGRESS NOTE ADULT - ASSESSMENT
Mr. 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

## 2024-03-26 NOTE — OCCUPATIONAL THERAPY INITIAL EVALUATION ADULT - ADDITIONAL COMMENTS
pt. resides alone in an elevator accessible apartment pt. resides alone in an elevator accessible apartment alone where he uses a RW for mobility and is primarily I in ADLs however has a HHA 5 days/ 12 hours Pt. reports he has a tub/shower w. a shower chair. He wears glasses

## 2024-03-26 NOTE — PROVIDER CONTACT NOTE (CRITICAL VALUE NOTIFICATION) - ASSESSMENT
Pt is A&O x4, no s/s of confusion, headache. Pt is c/o weakness but states that "he feels much better" during morning rounds.
Pt is A&O x4, no s/s of confusion, headache, worsening weakness.

## 2024-03-26 NOTE — CONSULT NOTE ADULT - PROBLEM SELECTOR RECOMMENDATION 3
Elevated blood pressures on admission with SBP 150s. Home medication Toprol 50 mg PO daily, one dose of Nifedipine administered.

## 2024-03-26 NOTE — PROGRESS NOTE ADULT - PROBLEM SELECTOR PLAN 2
#Weakness  Episode of fall due to lower extremity weakness 3/24 AM when ambulating to bathroom. Denies syncope. Likely in setting of COVID-19 infection.   - CT head without acute pathology and neurology exam without focal deficit.   - TSH wnl. Orthostatic vitals wnl.     Plan:   - Fall precaution  - PT recs [facility], patient prefers home PT

## 2024-03-26 NOTE — PROVIDER CONTACT NOTE (CRITICAL VALUE NOTIFICATION) - BACKGROUND
84 y/o M, history of parkinson's disease, vertigo, and GERD. S/p fall at home due to weakness. COVID + from 3/24/24.
84 y/o M, hx of parkinson's disease, vertigo, and GERD. S/p fall at home due to weakness. COVID + from 3/24/24.

## 2024-03-26 NOTE — PROGRESS NOTE ADULT - PROBLEM SELECTOR PLAN 1
Episode of fall due to lower extremity weakness 3/24 AM when ambulating to bathroom. Denies syncope. Likely in setting of COVID-19 infection. CT head without acute pathology and neurology exam without focal deficit. TSH wnl. Orthostatic vitals wnl.   - fall precaution  - f/u PT recs #SIADH  Patient reports chronically being in lower 130s, Na 132 in ED, received 1L NS. 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 UOsm >100 (ADH on), Mirela >30 (RAAS off), euvolemic on exam, consistent with SIADH.   - Fluid restriction 1.2L  - trend Na q6h (16:00 and 22:00)  - consider salt tabs if fails to improve #SIADH  Patient reports chronically being in lower 130s, Na 132 in ED, received 1L NS. 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 This admission, UOsm >100 (ADH on), Mirela >30 (RAAS off), euvolemic on exam, consistent with SIADH.   - Fluid restriction 1.2L  - trend Na q6h (16:00 and 22:00)  - consider salt tabs if fails to improve

## 2024-03-26 NOTE — CONSULT NOTE ADULT - SUBJECTIVE AND OBJECTIVE BOX
Mr. 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     Vitals: Afebrile, HR of 83, -180s/89, saturating 95% on RA.       Sinemet  mg PO daily  Toprol 50 mg PO daily  Protonix 40 mg PO daily  Rosuvastatin 10 mg PO daily    CT head:  Moderate chronic microvascular changes without evidence of   an acute transcortical infarction or hemorrhage.    SIADH with hyponatremia: Na of 118, serum osmolality of 253, Osmolality high, elevated sodium to 147. recieved 1L of NS in the ED, notable development of hyponatremia over previous 24 hours, continues to downtrend at this time.     Covid-19:      Allergies    No Known Allergies    Intolerances      Antimicrobials:      Other Medications:  atorvastatin 40 milliGRAM(s) Oral at bedtime  benzocaine/menthol Lozenge 1 Lozenge Oral two times a day PRN  carbidopa/levodopa  25/100 1 Tablet(s) Oral three times a day  chlorhexidine 2% Cloths 1 Application(s) Topical daily  enoxaparin Injectable 40 milliGRAM(s) SubCutaneous every 24 hours  metoprolol succinate ER 50 milliGRAM(s) Oral daily  pantoprazole    Tablet 40 milliGRAM(s) Oral before breakfast      FAMILY HISTORY:  FH: HTN (hypertension) (Father)    FH: diabetes mellitus (Father)    FH: heart failure (Father)      PAST MEDICAL & SURGICAL HISTORY:  Parkinsons      Vertigo      GERD (gastroesophageal reflux disease)      History of cataract surgery      History of corneal transplant    .  VITAL SIGNS:  T(C): 36.4 (03-26-24 @ 17:09), Max: 37.1 (03-26-24 @ 05:30)  T(F): 97.6 (03-26-24 @ 17:09), Max: 98.7 (03-26-24 @ 05:30)  HR: 83 (03-26-24 @ 17:09) (64 - 86)  BP: 128/82 (03-26-24 @ 17:09) (128/82 - 185/89)  BP(mean): --  RR: 17 (03-26-24 @ 17:09) (16 - 17)  SpO2: 96% (03-26-24 @ 17:09) (95% - 98%)  Wt(kg): --    PHYSICAL EXAM:  Constitutional: Patient is in no acute distress, resting comfortably in bed.  HEENT: Atraumatic/normocephalic. PERRL, EOMI, anicteric sclera, no nasal discharge; uvula midline, no oropharyngeal erythema or exudates; mucous membranes moist.   Neck: supple; no JVD or thyromegaly.  Respiratory: Clear to auscultation bilaterally; no Wheezing/Crackles/Ronchi, no accessory muscle use.   Cardiac: Regular rate and rhythm, S1/S2; no Murmur/Rub/Gallop; PMI non-displaced.  Gastrointestinal: abdomen soft, non-tender and non-distended; no rebound or guarding; Normoactive bowel sounds.   Back: spine midline, no bony tenderness or step-offs; no CVAT B/L  Extremities: Warm and Well perfused, no clubbing or cyanosis; no peripheral edema  Musculoskeletal: Normal ROM in upper and lower extremities; no joint swelling, tenderness or erythema  Vascular: 2+ radial, Dorsalis pedis and posterior tibial pulses bilaterally.  Dermatologic: skin warm, dry and intact; no rashes, wounds, or scars  Lymphatic: no submandibular or cervical LAD  Neurologic: AAOx3; CNII-XII grossly intact; no focal deficits  Psychiatric: affect and characteristics of appearance, verbalizations, behaviors are appropriate    Lab Results:                        13.2   5.33  )-----------( 194      ( 26 Mar 2024 05:30 )             39.3     03-26    118<LL>  |  88<L>  |  13  ----------------------------<  133<H>  4.1   |  21<L>  |  0.57    Ca    8.8      26 Mar 2024 17:44  Phos  3.0     03-26  Mg     1.7     03-26          Urinalysis Basic - ( 26 Mar 2024 17:44 )    Color: x / Appearance: x / SG: x / pH: x  Gluc: 133 mg/dL / Ketone: x  / Bili: x / Urobili: x   Blood: x / Protein: x / Nitrite: x   Leuk Esterase: x / RBC: x / WBC x   Sq Epi: x / Non Sq Epi: x / Bacteria: x   Mr. 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 Has had similar episodes in the past but has never had seizures, sensation changes. Patient reports that he feels well at this time, no nausea, vomiting.      Covid-19:      Allergies    No Known Allergies    Intolerances      Antimicrobials:      Other Medications:  atorvastatin 40 milliGRAM(s) Oral at bedtime  benzocaine/menthol Lozenge 1 Lozenge Oral two times a day PRN  carbidopa/levodopa  25/100 1 Tablet(s) Oral three times a day  chlorhexidine 2% Cloths 1 Application(s) Topical daily  enoxaparin Injectable 40 milliGRAM(s) SubCutaneous every 24 hours  metoprolol succinate ER 50 milliGRAM(s) Oral daily  pantoprazole    Tablet 40 milliGRAM(s) Oral before breakfast      FAMILY HISTORY:  FH: HTN (hypertension) (Father)    FH: diabetes mellitus (Father)    FH: heart failure (Father)      PAST MEDICAL & SURGICAL HISTORY:  Parkinsons      Vertigo      GERD (gastroesophageal reflux disease)      History of cataract surgery      History of corneal transplant    .  VITAL SIGNS:  T(C): 36.4 (03-26-24 @ 17:09), Max: 37.1 (03-26-24 @ 05:30)  T(F): 97.6 (03-26-24 @ 17:09), Max: 98.7 (03-26-24 @ 05:30)  HR: 83 (03-26-24 @ 17:09) (64 - 86)  BP: 128/82 (03-26-24 @ 17:09) (128/82 - 185/89)  BP(mean): --  RR: 17 (03-26-24 @ 17:09) (16 - 17)  SpO2: 96% (03-26-24 @ 17:09) (95% - 98%)  Wt(kg): --    PHYSICAL EXAM:  Constitutional: Patient is in no acute distress, resting comfortably in bed.  HEENT: Mild bruising to scalp, mucous membranes moist.   Respiratory: Clear to auscultation bilaterally;   Cardiac: Regular rate and rhythm, S1/S2;   Gastrointestinal: abdomen soft, non-tender and non-distended;   Extremities: Warm and Well perfused, no peripheral edema  Vascular: 2+ radial, Dorsalis pedis and posterior tibial pulses bilaterally.  Neurologic: AAOx3; Strength and sensation intact.     Lab Results:                        13.2   5.33  )-----------( 194      ( 26 Mar 2024 05:30 )             39.3     03-26    118<LL>  |  88<L>  |  13  ----------------------------<  133<H>  4.1   |  21<L>  |  0.57    Ca    8.8      26 Mar 2024 17:44  Phos  3.0     03-26  Mg     1.7     03-26          Urinalysis Basic - ( 26 Mar 2024 17:44 )    Color: x / Appearance: x / SG: x / pH: x  Gluc: 133 mg/dL / Ketone: x  / Bili: x / Urobili: x   Blood: x / Protein: x / Nitrite: x   Leuk Esterase: x / RBC: x / WBC x   Sq Epi: x / Non Sq Epi: x / Bacteria: x

## 2024-03-26 NOTE — CONSULT NOTE ADULT - PROBLEM SELECTOR RECOMMENDATION 2
COVID-19 positive, CXR shows RUL infiltrate, breathing comfortably on room air.  - Incentive spirometer  - No need for oxygen therapy, steroids, remdesavir at this time.

## 2024-03-26 NOTE — PROGRESS NOTE ADULT - SUBJECTIVE AND OBJECTIVE BOX
**** Acceptance from [facility] to [facility] ****  Hospital Course: Mr. 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 Has had similar episodes in the past but has never had seizures, sensation changes. Patient reports that he feels well at this time, no nausea, vomiting.    REVIEW OF SYSTEMS:  All other review of systems is negative unless indicated above.    OBJECTIVE:  T(C): 36.4 (03-26-24 @ 20:30), Max: 37.1 (03-26-24 @ 05:30)  HR: 85 (03-26-24 @ 20:30) (64 - 86)  BP: 155/92 (03-26-24 @ 20:30) (128/82 - 185/89)  RR: 18 (03-26-24 @ 20:30) (16 - 18)  SpO2: 95% (03-26-24 @ 20:30) (95% - 98%)  Daily     Daily     Physical Exam:  General: in no acute distress, resting comfortably in bed   HENT: Mild bruising to scalp, moist mucous membranes   Lungs: CTA B/L. No wheezes, rales, or rhonchi  Cardiovascular: RRR. No murmurs, rubs, or gallops  Abdomen: Soft, non-tender non-distended; No rebound or guarding  Extremities: WWP, No clubbing, cyanosis or edema  Neurological: Alert and oriented x3  Skin: Warm and dry. No obvious rash     Medications:  MEDICATIONS  (STANDING):  atorvastatin 40 milliGRAM(s) Oral at bedtime  carbidopa/levodopa  25/100 1 Tablet(s) Oral three times a day  chlorhexidine 2% Cloths 1 Application(s) Topical daily  enoxaparin Injectable 40 milliGRAM(s) SubCutaneous every 24 hours  metoprolol succinate ER 50 milliGRAM(s) Oral daily  pantoprazole    Tablet 40 milliGRAM(s) Oral before breakfast    MEDICATIONS  (PRN):  benzocaine/menthol Lozenge 1 Lozenge Oral two times a day PRN Sore Throat      Labs:                        13.2   5.33  )-----------( 194      ( 26 Mar 2024 05:30 )             39.3     03-26    118<LL>  |  88<L>  |  13  ----------------------------<  133<H>  4.1   |  21<L>  |  0.57    Ca    8.8      26 Mar 2024 17:44  Phos  3.0     03-26  Mg     1.7     03-26        Urinalysis Basic - ( 26 Mar 2024 17:44 )    Color: x / Appearance: x / SG: x / pH: x  Gluc: 133 mg/dL / Ketone: x  / Bili: x / Urobili: x   Blood: x / Protein: x / Nitrite: x   Leuk Esterase: x / RBC: x / WBC x   Sq Epi: x / Non Sq Epi: x / Bacteria: x          Radiology: Reviewed **** Acceptance from [facility] to [facility] ****  Hospital Course: 83 year old male with PMHx Parkinson's disease, vertigo, GERD, LBBB presents s/p fall likely 2/2 COVID-19 infection. Patient without respiratory symptoms. Course complicated by hyponatremia to 118, likely 2/2 SIADH. Had similar episode of SIADH during previous COVID dx  States the fall was from muscle weakness and denied head trauma and LOC, admitted for further workup. Found to be COVID 19 +, without acute symptoms.    Mr. 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 Has had similar episodes in the past but has never had seizures, sensation changes. Patient reports that he feels well at this time, no nausea, vomiting.      REVIEW OF SYSTEMS:  All other review of systems is negative unless indicated above.    OBJECTIVE:  T(C): 36.4 (03-26-24 @ 20:30), Max: 37.1 (03-26-24 @ 05:30)  HR: 85 (03-26-24 @ 20:30) (64 - 86)  BP: 155/92 (03-26-24 @ 20:30) (128/82 - 185/89)  RR: 18 (03-26-24 @ 20:30) (16 - 18)  SpO2: 95% (03-26-24 @ 20:30) (95% - 98%)  Daily     Daily     Physical Exam:  General: in no acute distress, resting comfortably in bed   HENT: Mild bruising to scalp, moist mucous membranes   Lungs: CTA B/L. No wheezes, rales, or rhonchi  Cardiovascular: RRR. No murmurs, rubs, or gallops  Abdomen: Soft, non-tender non-distended; No rebound or guarding  Extremities: WWP, No clubbing, cyanosis or edema  Neurological: Alert and oriented x3  Skin: Warm and dry. No obvious rash     Medications:  MEDICATIONS  (STANDING):  atorvastatin 40 milliGRAM(s) Oral at bedtime  carbidopa/levodopa  25/100 1 Tablet(s) Oral three times a day  chlorhexidine 2% Cloths 1 Application(s) Topical daily  enoxaparin Injectable 40 milliGRAM(s) SubCutaneous every 24 hours  metoprolol succinate ER 50 milliGRAM(s) Oral daily  pantoprazole    Tablet 40 milliGRAM(s) Oral before breakfast    MEDICATIONS  (PRN):  benzocaine/menthol Lozenge 1 Lozenge Oral two times a day PRN Sore Throat      Labs:                        13.2   5.33  )-----------( 194      ( 26 Mar 2024 05:30 )             39.3     03-26    118<LL>  |  88<L>  |  13  ----------------------------<  133<H>  4.1   |  21<L>  |  0.57    Ca    8.8      26 Mar 2024 17:44  Phos  3.0     03-26  Mg     1.7     03-26        Urinalysis Basic - ( 26 Mar 2024 17:44 )    Color: x / Appearance: x / SG: x / pH: x  Gluc: 133 mg/dL / Ketone: x  / Bili: x / Urobili: x   Blood: x / Protein: x / Nitrite: x   Leuk Esterase: x / RBC: x / WBC x   Sq Epi: x / Non Sq Epi: x / Bacteria: x          Radiology: Reviewed **** Acceptance from [facility] to [facility] ****  Hospital Course: 83 year old male with PMHx Parkinson's disease, vertigo, GERD, LBBB presents s/p fall likely 2/2 COVID-19 infection. Patient without respiratory symptoms. Course complicated by hyponatremia to 118, likely 2/2 SIADH. Has had similar episodes in the past but has never had seizures, sensation changes. Patient reports that he feels well at this time, no nausea, vomiting. Nephrology consulted. Recommending 3% saline infusion at 50cc/hr, goal sodium 132. Patient stepped up to [facility] for further management       REVIEW OF SYSTEMS:  All other review of systems is negative unless indicated above.    OBJECTIVE:  T(C): 36.4 (03-26-24 @ 20:30), Max: 37.1 (03-26-24 @ 05:30)  HR: 85 (03-26-24 @ 20:30) (64 - 86)  BP: 155/92 (03-26-24 @ 20:30) (128/82 - 185/89)  RR: 18 (03-26-24 @ 20:30) (16 - 18)  SpO2: 95% (03-26-24 @ 20:30) (95% - 98%)  Daily     Daily     Physical Exam:  General: in no acute distress, resting comfortably in bed   HENT: Mild bruising to scalp, moist mucous membranes   Lungs: CTA B/L. No wheezes, rales, or rhonchi  Cardiovascular: RRR. No murmurs, rubs, or gallops  Abdomen: Soft, non-tender non-distended; No rebound or guarding  Extremities: WWP, No clubbing, cyanosis or edema  Neurological: Alert and oriented x3  Skin: Warm and dry. No obvious rash     Medications:  MEDICATIONS  (STANDING):  atorvastatin 40 milliGRAM(s) Oral at bedtime  carbidopa/levodopa  25/100 1 Tablet(s) Oral three times a day  chlorhexidine 2% Cloths 1 Application(s) Topical daily  enoxaparin Injectable 40 milliGRAM(s) SubCutaneous every 24 hours  metoprolol succinate ER 50 milliGRAM(s) Oral daily  pantoprazole    Tablet 40 milliGRAM(s) Oral before breakfast    MEDICATIONS  (PRN):  benzocaine/menthol Lozenge 1 Lozenge Oral two times a day PRN Sore Throat      Labs:                        13.2   5.33  )-----------( 194      ( 26 Mar 2024 05:30 )             39.3     03-26    118<LL>  |  88<L>  |  13  ----------------------------<  133<H>  4.1   |  21<L>  |  0.57    Ca    8.8      26 Mar 2024 17:44  Phos  3.0     03-26  Mg     1.7     03-26        Urinalysis Basic - ( 26 Mar 2024 17:44 )    Color: x / Appearance: x / SG: x / pH: x  Gluc: 133 mg/dL / Ketone: x  / Bili: x / Urobili: x   Blood: x / Protein: x / Nitrite: x   Leuk Esterase: x / RBC: x / WBC x   Sq Epi: x / Non Sq Epi: x / Bacteria: x          Radiology: Reviewed

## 2024-03-26 NOTE — PROGRESS NOTE ADULT - PROBLEM SELECTOR PLAN 6
Hx of LBBB. follows cardiologist outpatient.   - f/u Cardiology outpatient Reports having history of vertigo numerous times per year and takes meclizine PRN with improvement. Meclizine given in ED however deferred ambulation in setting of recent fall  - meclizine 25mg q6h PRN

## 2024-03-26 NOTE — PROGRESS NOTE ADULT - SUBJECTIVE AND OBJECTIVE BOX
OVERNIGHT EVENTS:    SUBJECTIVE / INTERVAL HPI: Patient seen and examined at bedside. No complaints at this time. Patient denies: fever, chills, dizziness, weakness, HA, Changes in vision, CP, palpitations, SOB, cough, N/V/D/C, dysuria, changes in bowel movements, LE edema. ROS otherwise negative.    VITAL SIGNS:  Vital Signs Last 24 Hrs  T(C): 36.4 (26 Mar 2024 08:35), Max: 37.1 (26 Mar 2024 05:30)  T(F): 97.6 (26 Mar 2024 08:35), Max: 98.7 (26 Mar 2024 05:30)  HR: 64 (26 Mar 2024 08:35) (63 - 86)  BP: 171/91 (26 Mar 2024 08:35) (147/67 - 182/80)  BP(mean): --  RR: 17 (26 Mar 2024 08:35) (16 - 17)  SpO2: 98% (26 Mar 2024 08:35) (96% - 98%)    Parameters below as of 26 Mar 2024 08:35  Patient On (Oxygen Delivery Method): room air        PHYSICAL EXAM:    General: NAD  HEENT: NCAT  Neck: supple, trachea midline  Cardiovascular: S1, S2 normal; RRR, no M/G/R  Respiratory: CTABL; no W/R/R  Gastrointestinal: soft, nontender, nondistended. bowel sounds present.  Skin: no ulcerations or visible rashes appreciated  Extremities: WWP; no edema, clubbing or cyanosis  Vascular: 2+ radial, DP/PT pulses B/L  Neurological: AAOx3; CN II-XII grossly intact; no focal deficits    MEDICATIONS:  MEDICATIONS  (STANDING):  atorvastatin 40 milliGRAM(s) Oral at bedtime  carbidopa/levodopa  25/100 1 Tablet(s) Oral three times a day  chlorhexidine 2% Cloths 1 Application(s) Topical daily  enoxaparin Injectable 40 milliGRAM(s) SubCutaneous every 24 hours  metoprolol succinate ER 50 milliGRAM(s) Oral daily  pantoprazole    Tablet 40 milliGRAM(s) Oral before breakfast    MEDICATIONS  (PRN):  benzocaine/menthol Lozenge 1 Lozenge Oral two times a day PRN Sore Throat      ALLERGIES:  Allergies    No Known Allergies    Intolerances        LABS:                        13.2   5.33  )-----------( 194      ( 26 Mar 2024 05:30 )             39.3     03-26    123<L>  |  89<L>  |  9   ----------------------------<  100<H>  4.2   |  25  |  0.70    Ca    8.4      26 Mar 2024 05:30  Phos  3.0     03-26  Mg     1.7     03-26    TPro  6.7  /  Alb  4.0  /  TBili  1.0  /  DBili  x   /  AST  42<H>  /  ALT  24  /  AlkPhos  52  03-24      Urinalysis Basic - ( 26 Mar 2024 05:30 )    Color: x / Appearance: x / SG: x / pH: x  Gluc: 100 mg/dL / Ketone: x  / Bili: x / Urobili: x   Blood: x / Protein: x / Nitrite: x   Leuk Esterase: x / RBC: x / WBC x   Sq Epi: x / Non Sq Epi: x / Bacteria: x      CAPILLARY BLOOD GLUCOSE          RADIOLOGY & ADDITIONAL TESTS: Reviewed. OVERNIGHT EVENTS: none    SUBJECTIVE / INTERVAL HPI: Patient seen and examined at bedside. No complaints at this time. ROS otherwise negative.    VITAL SIGNS:  Vital Signs Last 24 Hrs  T(C): 36.4 (26 Mar 2024 08:35), Max: 37.1 (26 Mar 2024 05:30)  T(F): 97.6 (26 Mar 2024 08:35), Max: 98.7 (26 Mar 2024 05:30)  HR: 64 (26 Mar 2024 08:35) (63 - 86)  BP: 171/91 (26 Mar 2024 08:35) (147/67 - 182/80)  BP(mean): --  RR: 17 (26 Mar 2024 08:35) (16 - 17)  SpO2: 98% (26 Mar 2024 08:35) (96% - 98%)    Parameters below as of 26 Mar 2024 08:35  Patient On (Oxygen Delivery Method): room air        PHYSICAL EXAM:    General: NAD  HEENT: NCAT  Neck: supple, trachea midline  Cardiovascular: S1, S2 normal; RRR, no M/G/R  Respiratory: CTABL; no W/R/R  Gastrointestinal: soft, nontender, nondistended.   Skin: no ulcerations or visible rashes appreciated  Extremities: WWP; no edema, clubbing or cyanosis  Vascular: 2+ radial, DP/PT pulses B/L  Neurological: AAOx3; CN II-XII grossly intact; no focal deficits    MEDICATIONS:  MEDICATIONS  (STANDING):  atorvastatin 40 milliGRAM(s) Oral at bedtime  carbidopa/levodopa  25/100 1 Tablet(s) Oral three times a day  chlorhexidine 2% Cloths 1 Application(s) Topical daily  enoxaparin Injectable 40 milliGRAM(s) SubCutaneous every 24 hours  metoprolol succinate ER 50 milliGRAM(s) Oral daily  pantoprazole    Tablet 40 milliGRAM(s) Oral before breakfast    MEDICATIONS  (PRN):  benzocaine/menthol Lozenge 1 Lozenge Oral two times a day PRN Sore Throat      ALLERGIES:  Allergies    No Known Allergies    Intolerances        LABS:                        13.2   5.33  )-----------( 194      ( 26 Mar 2024 05:30 )             39.3     03-26    123<L>  |  89<L>  |  9   ----------------------------<  100<H>  4.2   |  25  |  0.70    Ca    8.4      26 Mar 2024 05:30  Phos  3.0     03-26  Mg     1.7     03-26    TPro  6.7  /  Alb  4.0  /  TBili  1.0  /  DBili  x   /  AST  42<H>  /  ALT  24  /  AlkPhos  52  03-24      Urinalysis Basic - ( 26 Mar 2024 05:30 )    Color: x / Appearance: x / SG: x / pH: x  Gluc: 100 mg/dL / Ketone: x  / Bili: x / Urobili: x   Blood: x / Protein: x / Nitrite: x   Leuk Esterase: x / RBC: x / WBC x   Sq Epi: x / Non Sq Epi: x / Bacteria: x      CAPILLARY BLOOD GLUCOSE          RADIOLOGY & ADDITIONAL TESTS: Reviewed.

## 2024-03-26 NOTE — PROGRESS NOTE ADULT - PROBLEM SELECTOR PLAN 4
On carbidopa-levodopa  TID. Follows neurologist Dr. Berlin Sanchez  - continue carbidopa-levodopa  TID    #HTN  Elevated blood pressures on admission with SBP 150s. Patient states he was on Lisinopril and Metoprolol, however Lisinopril was discontinued since BPs were well controlled. Pt is unsure why he is still on Metoprolol  - continue metoprolol 50mg daily  - f/u PCP COVID-19 positive, CXR shows RUL infiltrate, breathing comfortably on room air.  - Goal o2 sat >94%  - incentive spirometer  - Onset of symptoms >72 hours and without respiratory symptoms so no need for monoclonal antibody and no steroids as pt not hypoxic

## 2024-03-26 NOTE — PROGRESS NOTE ADULT - PROBLEM SELECTOR PLAN 6
Reports having history of vertigo numerous times per year and takes meclizine PRN with improvement.     - C/w meclizine 25mg q6h PRN

## 2024-03-26 NOTE — OCCUPATIONAL THERAPY INITIAL EVALUATION ADULT - MODIFIED CLINICAL TEST OF SENSORY INTEGRATION IN BALANCE TEST
pt. tolerated dangle at EOB ~6 minutes to engage in MMT and LB dressing. Pt. hypertensive so STS deferred, pt. required Mod A to scoot at EOB towards HOB

## 2024-03-26 NOTE — PROGRESS NOTE ADULT - PROBLEM SELECTOR PLAN 3
COVID-19 positive, CXR shows RUL infiltrate, breathing comfortably on room air.  - Goal o2 sat >94%  - incentive spirometer  - Onset of symptoms >72 hours and without respiratory symptoms so no need for monoclonal antibody and no steroids as pt not hypoxic Likely 2/2 COVID-19 infection. Pt reports imbalance which he attributes to being unsteady on feet which is likely 2/2 his COVID-19 infection  - management as above in fall

## 2024-03-26 NOTE — PROGRESS NOTE ADULT - ASSESSMENT
83M with PMH of Parkinson's disease, vertigo, GERD, LBBB presenting with fall due to weakness likely 2/2 COVID-19 pending PT eval. 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

## 2024-03-26 NOTE — CHART NOTE - NSCHARTNOTEFT_GEN_A_CORE
Paged by primary team for hyponatremia of 118. Chart reviewed, patient 83 year old male with PMHx Parkinson's disease, vertigo, GERD, LBBB presents from home after having a fall due to weakness and found to be positive for COVID-19 and admitted for further management.        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 Mccabe and Dr. Stewart

## 2024-03-26 NOTE — PROGRESS NOTE ADULT - PROBLEM SELECTOR PLAN 1
Patient with chronic hyponatremia. Sodium decreased on 3/26 AM labs from 123 --> 120, suspected SIADH. Had a similar episode in 2020 while having COVID. weakness and fall, was given salt tablets at rehab facility and improved. UOsm >100 (ADH on), Mirela >30 (RAAS off), euvolemic on exam, consistent with SIADH.     - 3% saline infusion at 50ccs/hour per nephrology recs, goal sodium 132. Will require about 1L.   - Start fluid restriction to 1L.   - Continue q4 hour BMP, urine sodium, and urine osmolality.   - AM cortisol.

## 2024-03-26 NOTE — CONSULT NOTE ADULT - PROBLEM SELECTOR RECOMMENDATION 7
F: Fluid restriction.   E: Replete PRN.   N: Regular diet.   DVT ppx: Lovenox.   Dispo: [facility] -> Telemetry.

## 2024-03-26 NOTE — OCCUPATIONAL THERAPY INITIAL EVALUATION ADULT - NSOTDISCHREC_GEN_A_CORE
if pt. to be d/c home would benefit from HOT as well assist x1 for all ADLs and functional transfers/mob/Sub-acute Rehab

## 2024-03-26 NOTE — PROGRESS NOTE ADULT - PROBLEM SELECTOR PLAN 2
Likely 2/2 COVID-19 infection. Pt reports imbalance which he attributes to being unsteady on feet which is likely 2/2 his COVID-19 infection  - management as above in fall Episode of fall due to lower extremity weakness 3/24 AM when ambulating to bathroom. Denies syncope. Likely in setting of COVID-19 infection. CT head without acute pathology and neurology exam without focal deficit. TSH wnl. Orthostatic vitals wnl.   - fall precaution  - PT recs YOUNG, patient prefers home PT

## 2024-03-26 NOTE — PROGRESS NOTE ADULT - PROBLEM SELECTOR PLAN 5
Reports having history of vertigo numerous times per year and takes meclizine PRN with improvement. Meclizine given in ED however deferred ambulation in setting of recent fall  - meclizine 25mg q6h PRN On carbidopa-levodopa  TID. Follows neurologist Dr. Berlin Sanchez  - continue carbidopa-levodopa  TID    #HTN  Elevated blood pressures on admission with SBP 150s. Patient states he was on Lisinopril and Metoprolol, however Lisinopril was discontinued since BPs were well controlled. Pt is unsure why he is still on Metoprolol  - continue metoprolol 50mg daily  - f/u PCP

## 2024-03-26 NOTE — PROVIDER CONTACT NOTE (CRITICAL VALUE NOTIFICATION) - ACTION/TREATMENT ORDERED:
Per MD, awaiting results of urine studies. Strict I's &O 's. Per MD, awaiting results of urine studies and serum osmolalities. Strict I's &O 's. 1200cc fluid restriction.

## 2024-03-27 LAB
ANION GAP SERPL CALC-SCNC: 10 MMOL/L — SIGNIFICANT CHANGE UP (ref 5–17)
ANION GAP SERPL CALC-SCNC: 7 MMOL/L — SIGNIFICANT CHANGE UP (ref 5–17)
ANION GAP SERPL CALC-SCNC: 8 MMOL/L — SIGNIFICANT CHANGE UP (ref 5–17)
ANION GAP SERPL CALC-SCNC: 9 MMOL/L — SIGNIFICANT CHANGE UP (ref 5–17)
BUN SERPL-MCNC: 10 MG/DL — SIGNIFICANT CHANGE UP (ref 7–23)
BUN SERPL-MCNC: 11 MG/DL — SIGNIFICANT CHANGE UP (ref 7–23)
BUN SERPL-MCNC: 11 MG/DL — SIGNIFICANT CHANGE UP (ref 7–23)
BUN SERPL-MCNC: 12 MG/DL — SIGNIFICANT CHANGE UP (ref 7–23)
BUN SERPL-MCNC: 12 MG/DL — SIGNIFICANT CHANGE UP (ref 7–23)
BUN SERPL-MCNC: 13 MG/DL — SIGNIFICANT CHANGE UP (ref 7–23)
CALCIUM SERPL-MCNC: 8.4 MG/DL — SIGNIFICANT CHANGE UP (ref 8.4–10.5)
CALCIUM SERPL-MCNC: 8.5 MG/DL — SIGNIFICANT CHANGE UP (ref 8.4–10.5)
CALCIUM SERPL-MCNC: 8.5 MG/DL — SIGNIFICANT CHANGE UP (ref 8.4–10.5)
CALCIUM SERPL-MCNC: 8.6 MG/DL — SIGNIFICANT CHANGE UP (ref 8.4–10.5)
CALCIUM SERPL-MCNC: 8.6 MG/DL — SIGNIFICANT CHANGE UP (ref 8.4–10.5)
CALCIUM SERPL-MCNC: 9 MG/DL — SIGNIFICANT CHANGE UP (ref 8.4–10.5)
CHLORIDE SERPL-SCNC: 90 MMOL/L — LOW (ref 96–108)
CHLORIDE SERPL-SCNC: 90 MMOL/L — LOW (ref 96–108)
CHLORIDE SERPL-SCNC: 94 MMOL/L — LOW (ref 96–108)
CHLORIDE SERPL-SCNC: 95 MMOL/L — LOW (ref 96–108)
CHLORIDE SERPL-SCNC: 95 MMOL/L — LOW (ref 96–108)
CHLORIDE SERPL-SCNC: 97 MMOL/L — SIGNIFICANT CHANGE UP (ref 96–108)
CO2 SERPL-SCNC: 20 MMOL/L — LOW (ref 22–31)
CO2 SERPL-SCNC: 21 MMOL/L — LOW (ref 22–31)
CO2 SERPL-SCNC: 21 MMOL/L — LOW (ref 22–31)
CO2 SERPL-SCNC: 22 MMOL/L — SIGNIFICANT CHANGE UP (ref 22–31)
CO2 SERPL-SCNC: 22 MMOL/L — SIGNIFICANT CHANGE UP (ref 22–31)
CO2 SERPL-SCNC: 25 MMOL/L — SIGNIFICANT CHANGE UP (ref 22–31)
CORTIS AM PEAK SERPL-MCNC: 12.01 UG/DL — SIGNIFICANT CHANGE UP (ref 6.02–18.4)
CREAT SERPL-MCNC: 0.46 MG/DL — LOW (ref 0.5–1.3)
CREAT SERPL-MCNC: 0.49 MG/DL — LOW (ref 0.5–1.3)
CREAT SERPL-MCNC: 0.51 MG/DL — SIGNIFICANT CHANGE UP (ref 0.5–1.3)
CREAT SERPL-MCNC: 0.52 MG/DL — SIGNIFICANT CHANGE UP (ref 0.5–1.3)
CREAT SERPL-MCNC: 0.54 MG/DL — SIGNIFICANT CHANGE UP (ref 0.5–1.3)
CREAT SERPL-MCNC: 0.57 MG/DL — SIGNIFICANT CHANGE UP (ref 0.5–1.3)
EGFR: 100 ML/MIN/1.73M2 — SIGNIFICANT CHANGE UP
EGFR: 101 ML/MIN/1.73M2 — SIGNIFICANT CHANGE UP
EGFR: 103 ML/MIN/1.73M2 — SIGNIFICANT CHANGE UP
EGFR: 97 ML/MIN/1.73M2 — SIGNIFICANT CHANGE UP
EGFR: 98 ML/MIN/1.73M2 — SIGNIFICANT CHANGE UP
EGFR: 99 ML/MIN/1.73M2 — SIGNIFICANT CHANGE UP
GLUCOSE BLDC GLUCOMTR-MCNC: 113 MG/DL — HIGH (ref 70–99)
GLUCOSE SERPL-MCNC: 102 MG/DL — HIGH (ref 70–99)
GLUCOSE SERPL-MCNC: 108 MG/DL — HIGH (ref 70–99)
GLUCOSE SERPL-MCNC: 110 MG/DL — HIGH (ref 70–99)
GLUCOSE SERPL-MCNC: 99 MG/DL — SIGNIFICANT CHANGE UP (ref 70–99)
HCT VFR BLD CALC: 40.3 % — SIGNIFICANT CHANGE UP (ref 39–50)
HGB BLD-MCNC: 13.8 G/DL — SIGNIFICANT CHANGE UP (ref 13–17)
MAGNESIUM SERPL-MCNC: 1.6 MG/DL — SIGNIFICANT CHANGE UP (ref 1.6–2.6)
MCHC RBC-ENTMCNC: 30.5 PG — SIGNIFICANT CHANGE UP (ref 27–34)
MCHC RBC-ENTMCNC: 34.2 GM/DL — SIGNIFICANT CHANGE UP (ref 32–36)
MCV RBC AUTO: 89 FL — SIGNIFICANT CHANGE UP (ref 80–100)
NRBC # BLD: 0 /100 WBCS — SIGNIFICANT CHANGE UP (ref 0–0)
OSMOLALITY UR: 493 MOSM/KG — SIGNIFICANT CHANGE UP (ref 300–900)
OSMOLALITY UR: 510 MOSM/KG — SIGNIFICANT CHANGE UP (ref 300–900)
OSMOLALITY UR: 525 MOSM/KG — SIGNIFICANT CHANGE UP (ref 300–900)
OSMOLALITY UR: 550 MOSM/KG — SIGNIFICANT CHANGE UP (ref 300–900)
OSMOLALITY UR: 627 MOSM/KG — SIGNIFICANT CHANGE UP (ref 300–900)
OSMOLALITY UR: 633 MOSM/KG — SIGNIFICANT CHANGE UP (ref 300–900)
PHOSPHATE SERPL-MCNC: 2.7 MG/DL — SIGNIFICANT CHANGE UP (ref 2.5–4.5)
PLATELET # BLD AUTO: 183 K/UL — SIGNIFICANT CHANGE UP (ref 150–400)
POTASSIUM SERPL-MCNC: 3.9 MMOL/L — SIGNIFICANT CHANGE UP (ref 3.5–5.3)
POTASSIUM SERPL-MCNC: 4 MMOL/L — SIGNIFICANT CHANGE UP (ref 3.5–5.3)
POTASSIUM SERPL-MCNC: 4.1 MMOL/L — SIGNIFICANT CHANGE UP (ref 3.5–5.3)
POTASSIUM SERPL-MCNC: SIGNIFICANT CHANGE UP (ref 3.5–5.3)
POTASSIUM SERPL-SCNC: 3.9 MMOL/L — SIGNIFICANT CHANGE UP (ref 3.5–5.3)
POTASSIUM SERPL-SCNC: 4 MMOL/L — SIGNIFICANT CHANGE UP (ref 3.5–5.3)
POTASSIUM SERPL-SCNC: 4.1 MMOL/L — SIGNIFICANT CHANGE UP (ref 3.5–5.3)
POTASSIUM SERPL-SCNC: SIGNIFICANT CHANGE UP (ref 3.5–5.3)
RBC # BLD: 4.53 M/UL — SIGNIFICANT CHANGE UP (ref 4.2–5.8)
RBC # FLD: 12.4 % — SIGNIFICANT CHANGE UP (ref 10.3–14.5)
SODIUM SERPL-SCNC: 121 MMOL/L — LOW (ref 135–145)
SODIUM SERPL-SCNC: 122 MMOL/L — LOW (ref 135–145)
SODIUM SERPL-SCNC: 124 MMOL/L — LOW (ref 135–145)
SODIUM SERPL-SCNC: 125 MMOL/L — LOW (ref 135–145)
SODIUM SERPL-SCNC: 126 MMOL/L — LOW (ref 135–145)
SODIUM SERPL-SCNC: 126 MMOL/L — LOW (ref 135–145)
SODIUM UR-SCNC: 125 MMOL/L — SIGNIFICANT CHANGE UP
SODIUM UR-SCNC: 139 MMOL/L — SIGNIFICANT CHANGE UP
SODIUM UR-SCNC: 143 MMOL/L — SIGNIFICANT CHANGE UP
SODIUM UR-SCNC: 160 MMOL/L — SIGNIFICANT CHANGE UP
SODIUM UR-SCNC: 166 MMOL/L — SIGNIFICANT CHANGE UP
SODIUM UR-SCNC: 171 MMOL/L — SIGNIFICANT CHANGE UP
TSH SERPL-MCNC: 3.71 UIU/ML — SIGNIFICANT CHANGE UP (ref 0.27–4.2)
WBC # BLD: 5.13 K/UL — SIGNIFICANT CHANGE UP (ref 3.8–10.5)
WBC # FLD AUTO: 5.13 K/UL — SIGNIFICANT CHANGE UP (ref 3.8–10.5)

## 2024-03-27 PROCEDURE — 99223 1ST HOSP IP/OBS HIGH 75: CPT

## 2024-03-27 PROCEDURE — 99232 SBSQ HOSP IP/OBS MODERATE 35: CPT | Mod: GC

## 2024-03-27 RX ORDER — MAGNESIUM SULFATE 500 MG/ML
2 VIAL (ML) INJECTION ONCE
Refills: 0 | Status: COMPLETED | OUTPATIENT
Start: 2024-03-27 | End: 2024-03-27

## 2024-03-27 RX ADMIN — Medication 50 MILLIGRAM(S): at 05:30

## 2024-03-27 RX ADMIN — CARBIDOPA AND LEVODOPA 1 TABLET(S): 25; 100 TABLET ORAL at 05:30

## 2024-03-27 RX ADMIN — Medication 25 GRAM(S): at 07:35

## 2024-03-27 RX ADMIN — SODIUM CHLORIDE 50 MILLILITER(S): 5 INJECTION, SOLUTION INTRAVENOUS at 21:07

## 2024-03-27 RX ADMIN — SODIUM CHLORIDE 50 MILLILITER(S): 5 INJECTION, SOLUTION INTRAVENOUS at 10:56

## 2024-03-27 RX ADMIN — PANTOPRAZOLE SODIUM 40 MILLIGRAM(S): 20 TABLET, DELAYED RELEASE ORAL at 05:31

## 2024-03-27 RX ADMIN — CARBIDOPA AND LEVODOPA 1 TABLET(S): 25; 100 TABLET ORAL at 21:06

## 2024-03-27 RX ADMIN — CARBIDOPA AND LEVODOPA 1 TABLET(S): 25; 100 TABLET ORAL at 14:47

## 2024-03-27 RX ADMIN — ENOXAPARIN SODIUM 40 MILLIGRAM(S): 100 INJECTION SUBCUTANEOUS at 21:07

## 2024-03-27 RX ADMIN — ATORVASTATIN CALCIUM 40 MILLIGRAM(S): 80 TABLET, FILM COATED ORAL at 21:06

## 2024-03-27 NOTE — PROGRESS NOTE ADULT - PROBLEM SELECTOR PLAN 6
Reports having history of vertigo numerous times per year and takes meclizine PRN with improvement. Patient has not had any dizziness or vertigo.  - will restart PRN should patient require

## 2024-03-27 NOTE — PROGRESS NOTE ADULT - SUBJECTIVE AND OBJECTIVE BOX
ABBEY JOHNSON  83y  Male      Patient is a 83y old  Male who presents with a chief complaint of fall, weakness, covid-19 (27 Mar 2024 07:37)      INTERVAL HPI/OVERNIGHT EVENTS: NIDIA overnight. This morning, patient denies any SOB, CP, N/V/D/C or abdominal pain. Denies any dizziness, headaches, changes to vision. Reports feeling generally well, he has been told he has low sodium in the past when he was sick.       Vital Signs Last 24 Hrs  T(C): 36.6 (27 Mar 2024 09:19), Max: 36.6 (26 Mar 2024 21:47)  T(F): 97.9 (27 Mar 2024 09:19), Max: 97.9 (26 Mar 2024 21:47)  HR: 74 (27 Mar 2024 09:05) (74 - 85)  BP: 135/68 (27 Mar 2024 09:05) (126/70 - 185/89)  BP(mean): 96 (27 Mar 2024 09:05) (92 - 107)  RR: 18 (27 Mar 2024 09:05) (12 - 36)  SpO2: 96% (27 Mar 2024 09:05) (94% - 98%)    Parameters below as of 27 Mar 2024 09:05  Patient On (Oxygen Delivery Method): room air        PHYSICAL EXAM:  GENERAL: NAD, well-groomed, well-developed; alert and oriented   EYES: EOMI, conjunctiva and sclera clear  ENMT: Moist mucous membranes, Good dentition  NERVOUS SYSTEM:  Alert & Oriented X3, Good concentration  CHEST/LUNG: Clear to auscultation bilaterally; No rales, rhonchi, wheezing, or rubs  HEART: Regular rate and rhythm; No murmurs, rubs, or gallops  ABDOMEN: Soft, Nontender, Nondistended; Bowel sounds present  EXTREMITIES:  2+ Peripheral Pulses, No clubbing, cyanosis, or edema    Consultant(s) Notes Reviewed:  [x ] YES  [ ] NO  Care Discussed with Consultants/Other Providers [ x] YES  [ ] NO    LABS:                        13.8   5.13  )-----------( 183      ( 27 Mar 2024 06:25 )             40.3     03-27    125<L>  |  94<L>  |  11  ----------------------------<  102<H>  See Note   |  21<L>  |  0.46<L>    Ca    8.6      27 Mar 2024 10:00  Phos  2.7     03-27  Mg     1.6     03-27        Urinalysis Basic - ( 27 Mar 2024 10:00 )    Color: x / Appearance: x / SG: x / pH: x  Gluc: 102 mg/dL / Ketone: x  / Bili: x / Urobili: x   Blood: x / Protein: x / Nitrite: x   Leuk Esterase: x / RBC: x / WBC x   Sq Epi: x / Non Sq Epi: x / Bacteria: x        CAPILLARY BLOOD GLUCOSE      POCT Blood Glucose.: 113 mg/dL (27 Mar 2024 11:27)      RADIOLOGY & ADDITIONAL TESTS:    Imaging Personally Reviewed:  [ ] YES  [ ] NO

## 2024-03-27 NOTE — PROGRESS NOTE ADULT - PROBLEM SELECTOR PLAN 1
Patient with chronic hyponatremia. Sodium decreased on 3/26 AM labs from 123 --> 120, suspected SIADH. Had a similar episode in 2020 while having COVID. weakness and fall, was given salt tablets at rehab facility and improved. UOsm >100 (ADH on), Mirela >30, euvolemic on exam, consistent with SIADH. AM cortisol wnl.     - 3% saline infusion at 50ccs/hour per nephrology recs, goal sodium 132.   - Start fluid restriction to 1L.   - Continue q4 hour BMP, urine sodium, and urine osmolality.

## 2024-03-27 NOTE — PROGRESS NOTE ADULT - PROBLEM SELECTOR PLAN 2
#Weakness  Episode of fall due to lower extremity weakness 3/24 AM when ambulating to bathroom. Denies syncope. Likely in setting of COVID-19 infection.   - CT head without acute pathology and neurology exam without focal deficit.   - TSH wnl. Orthostatic vitals wnl.     Plan:   - Fall precaution  - PT recs YOUNG

## 2024-03-27 NOTE — CONSULT NOTE ADULT - ASSESSMENT
82yo M w/ PMH Parkinson's admitted for fall on 3/24 found to be covid positive with renal consulted on 3/26 for hyponatremia.     #Hyponatremia likely iso SIADH 2/2 Covid Pneumonia  Serum osmolality low with urine osmolality in the 500s iso new covid pneumonia suggestive of SIADH leading to hyponatremia.   - continue 3% hypertonic saline at 50cc/hr for 10 hours for a total of 500cc  - goal serum Na of 130-133 by 10am 3/28  - continue fluids restriction of 1L  - check a 6pm BMP, goal serum Na of 128  - check a bmp on 3/28 am labs with goal serum Na as above  82yo M w/ PMH Parkinson's admitted for fall on 3/24 found to be covid positive with renal consulted on 3/26 for hyponatremia.     #Hyponatremia likely iso SIADH 2/2 Covid Pneumonia  Serum osmolality low with urine osmolality in the 500s iso new covid pneumonia suggestive of SIADH leading to hyponatremia.   - continue 3% hypertonic saline at 50cc/hr for 10 hours for a total of 500cc  - goal serum Na of 130-133 by 10am 3/28  - continue fluids restriction of 1L  - check a 6pm BMP, goal serum Na of 128  - check a bmp on 3/28 am labs with goal serum Na as above     -------Nephrology fellow--------  Chronic hyponatremia (unknown last time was eunatremic, arrived with 132meq, also had low serum Na in 2019 and 2020). SIADH.   U. Osmo consistently >493.   We are treating chronic hyponatremia, and using a correction rate of ~6meq in 24h period.   Using Na of 120mmol on 3/26 @ 10 am, today's 10am Na of 125 at goal, also noted Na 124 @ 4pm.   Currently on hypertonic @ 50ml/hr.     Plan:  Goal is serum Na of 130-133 for 3/28 @ 10 am, not higher than 133meq.   Cont. 3% hypertonic @ 50ml/hr  Obtain BMP and U. osmo tonight @ 10pm.   Cont. fluid restriction<1.2L daily    Initial (On Arrival)

## 2024-03-27 NOTE — CONSULT NOTE ADULT - ATTENDING COMMENTS
review with renal fellow overnight, seen with renal team this AM  84yo man with hyponatremia, 132 on admit 3/24, down to 123 yesterday  and acute drop to 118 last PM when renal called  urine data c/w SIADH  Na 125 this AM after 3% saline started overnight  went to ED after a fall, found to have COVID PNA; underlying h/o Parkinson's    adding 3% saline as outlined above  target approx 6 point rise to 131
83 M with Parkinson's disease, vertigo, LBBB, and GERD presents with fall due to generalized weakness in the setting of COVID-19 infection. He developed hyponatremia likely due to SIADH in the setting of viral infection. He had similar episodes in the past. Physical exam as above. CT head negative for mass and bleed.   1. Hyponatremia  2. SIADH  3. COVID infection  4. Parkinson's  - 3% NaCl  - water restriction  - no indication for COVID treatment   - Sinemet

## 2024-03-27 NOTE — PROGRESS NOTE ADULT - ASSESSMENT
Mr. 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 Continuing to monitor with serial BMP and urine lytes.

## 2024-03-27 NOTE — CONSULT NOTE ADULT - SUBJECTIVE AND OBJECTIVE BOX
NEPHROLOGY CONSULTATION NOTE  82yo M w/ PMH Parkinson's admitted for fall on 3/24 found to be covid positive with renal consulted on 3/26 for hyponatremia.     3/24 serum Na 132 and given 1L NS w/ repeat serum Na on 3/25 of 132. On 3/26 at 5:30 AM serum Na of 123, repeat 10AM serum Na of 120 and 6PM 118.   Urine osm of 512 3/26 AM. Urine Na 168 3/26 AM.  Urine 3/24 6 RBCs, trace protein.    After the consult last night, assessed to be hyponatremia consistent with SIADH likely in setting of COVID PNA with recommendations to start 50cc/hr of 3% hypertonic saline with repeat BMP q4hrs along with Urine Na and Urine osm for a goal serum Na 132 by 3/27 AM, and to check a TSH/am cortisol.    Serum Na as of 3/27 AM of 122, 10am serum Na 125.  Urine Na as of 3/27 AM of 160, urine osm 525.  TSH and cortisol WNL.    Per discussion with patient, he is chronically hyponatremic and has been told to limit his water intake in the past.     PAST MEDICAL & SURGICAL HISTORY:  Parkinsons      Vertigo      GERD (gastroesophageal reflux disease)      History of cataract surgery      History of corneal transplant        Allergies:  No Known Allergies    Home Medications Reviewed  Hospital Medications:   MEDICATIONS  (STANDING):  atorvastatin 40 milliGRAM(s) Oral at bedtime  carbidopa/levodopa  25/100 1 Tablet(s) Oral three times a day  chlorhexidine 2% Cloths 1 Application(s) Topical daily  enoxaparin Injectable 40 milliGRAM(s) SubCutaneous every 24 hours  metoprolol succinate ER 50 milliGRAM(s) Oral daily  pantoprazole    Tablet 40 milliGRAM(s) Oral before breakfast  sodium chloride 3%. 500 milliLiter(s) (50 mL/Hr) IV Continuous <Continuous>    SOCIAL HISTORY:  Denies ETOH,Smoking,     FAMILY HISTORY:  FH: HTN (hypertension) (Father)    FH: diabetes mellitus (Father)    FH: heart failure (Father)    REVIEW OF SYSTEMS:  All other review of systems is negative unless indicated above.    VITALS:  Vital Signs Last 24 Hrs  T(C): 36.6 (27 Mar 2024 09:19), Max: 36.6 (26 Mar 2024 21:47)  T(F): 97.9 (27 Mar 2024 09:19), Max: 97.9 (26 Mar 2024 21:47)  HR: 67 (27 Mar 2024 13:08) (67 - 85)  BP: 121/67 (27 Mar 2024 13:08) (121/67 - 185/89)  BP(mean): 89 (27 Mar 2024 13:08) (89 - 107)  RR: 18 (27 Mar 2024 13:08) (12 - 36)  SpO2: 97% (27 Mar 2024 13:08) (94% - 98%)    Parameters below as of 27 Mar 2024 13:08  Patient On (Oxygen Delivery Method): room air      03-26 @ 07:01  -  03-27 @ 07:00  --------------------------------------------------------  IN: 520 mL / OUT: 1100 mL / NET: -580 mL    03-27 @ 07:01  -  03-27 @ 14:56  --------------------------------------------------------  IN: 250 mL / OUT: 425 mL / NET: -175 mL      Height (cm): 172.7 (03-26 @ 21:47)  Weight (kg): 47.9 (03-26 @ 21:47)  BMI (kg/m2): 16.1 (03-26 @ 21:47)  BSA (m2): 1.56 (03-26 @ 21:47)    PHYSICAL EXAM:  Constitutional: NAD  Respiratory: CTAB, no wheezes, rales or rhonchi  Cardiovascular: S1, S2, RRR  Gastrointestinal: ND/NT, +BS  Extremities:  No peripheral edema in LEs.   Neurological: A/O x 3    LABS:  03-27    125<L>  |  94<L>  |  11  ----------------------------<  102<H>  See Note   |  21<L>  |  0.46<L>    Ca    8.6      27 Mar 2024 10:00  Phos  2.7     03-27  Mg     1.6     03-27      Creatinine Trend: 0.46 <--, 0.57 <--, 0.54 <--, 0.53 <--, 0.57 <--, See Note <--, 0.69 <--, 0.70 <--, 0.73 <--, 0.85 <--                        13.8   5.13  )-----------( 183      ( 27 Mar 2024 06:25 )             40.3     Urine Studies:  Urinalysis Basic - ( 27 Mar 2024 10:00 )    Color:  / Appearance:  / SG:  / pH:   Gluc: 102 mg/dL / Ketone:   / Bili:  / Urobili:    Blood:  / Protein:  / Nitrite:    Leuk Esterase:  / RBC:  / WBC    Sq Epi:  / Non Sq Epi:  / Bacteria:       Sodium, Random Urine: 160 mmol/L (03-27 @ 09:36)  Osmolality, Random Urine: 525 mosm/kg (03-27 @ 09:36)  Sodium, Random Urine: 139 mmol/L (03-27 @ 05:43)  Osmolality, Random Urine: 493 mosm/kg (03-27 @ 05:43)  Sodium, Random Urine: 125 mmol/L (03-27 @ 02:32)  Osmolality, Random Urine: 510 mosm/kg (03-27 @ 02:32)  Osmolality, Random Urine: 658 mosm/kg (03-26 @ 16:30)  Sodium, Random Urine: 147 mmol/L (03-26 @ 16:30)  Osmolality, Random Urine: 512 mosm/kg (03-26 @ 08:35)  Sodium, Random Urine: 168 mmol/L (03-26 @ 08:35)      RADIOLOGY & ADDITIONAL STUDIES:   - Reviewed

## 2024-03-28 LAB
ANION GAP SERPL CALC-SCNC: 12 MMOL/L — SIGNIFICANT CHANGE UP (ref 5–17)
ANION GAP SERPL CALC-SCNC: 5 MMOL/L — SIGNIFICANT CHANGE UP (ref 5–17)
ANION GAP SERPL CALC-SCNC: 6 MMOL/L — SIGNIFICANT CHANGE UP (ref 5–17)
ANION GAP SERPL CALC-SCNC: 7 MMOL/L — SIGNIFICANT CHANGE UP (ref 5–17)
ANION GAP SERPL CALC-SCNC: 8 MMOL/L — SIGNIFICANT CHANGE UP (ref 5–17)
BUN SERPL-MCNC: 10 MG/DL — SIGNIFICANT CHANGE UP (ref 7–23)
BUN SERPL-MCNC: 13 MG/DL — SIGNIFICANT CHANGE UP (ref 7–23)
BUN SERPL-MCNC: 14 MG/DL — SIGNIFICANT CHANGE UP (ref 7–23)
BUN SERPL-MCNC: 14 MG/DL — SIGNIFICANT CHANGE UP (ref 7–23)
BUN SERPL-MCNC: 17 MG/DL — SIGNIFICANT CHANGE UP (ref 7–23)
CALCIUM SERPL-MCNC: 8.8 MG/DL — SIGNIFICANT CHANGE UP (ref 8.4–10.5)
CALCIUM SERPL-MCNC: 8.8 MG/DL — SIGNIFICANT CHANGE UP (ref 8.4–10.5)
CALCIUM SERPL-MCNC: 9 MG/DL — SIGNIFICANT CHANGE UP (ref 8.4–10.5)
CALCIUM SERPL-MCNC: 9 MG/DL — SIGNIFICANT CHANGE UP (ref 8.4–10.5)
CALCIUM SERPL-MCNC: 9.1 MG/DL — SIGNIFICANT CHANGE UP (ref 8.4–10.5)
CHLORIDE SERPL-SCNC: 101 MMOL/L — SIGNIFICANT CHANGE UP (ref 96–108)
CHLORIDE SERPL-SCNC: 102 MMOL/L — SIGNIFICANT CHANGE UP (ref 96–108)
CHLORIDE SERPL-SCNC: 103 MMOL/L — SIGNIFICANT CHANGE UP (ref 96–108)
CHLORIDE SERPL-SCNC: 103 MMOL/L — SIGNIFICANT CHANGE UP (ref 96–108)
CHLORIDE SERPL-SCNC: 97 MMOL/L — SIGNIFICANT CHANGE UP (ref 96–108)
CO2 SERPL-SCNC: 20 MMOL/L — LOW (ref 22–31)
CO2 SERPL-SCNC: 23 MMOL/L — SIGNIFICANT CHANGE UP (ref 22–31)
CO2 SERPL-SCNC: 25 MMOL/L — SIGNIFICANT CHANGE UP (ref 22–31)
CREAT SERPL-MCNC: 0.5 MG/DL — SIGNIFICANT CHANGE UP (ref 0.5–1.3)
CREAT SERPL-MCNC: 0.55 MG/DL — SIGNIFICANT CHANGE UP (ref 0.5–1.3)
CREAT SERPL-MCNC: 0.57 MG/DL — SIGNIFICANT CHANGE UP (ref 0.5–1.3)
CREAT SERPL-MCNC: 0.62 MG/DL — SIGNIFICANT CHANGE UP (ref 0.5–1.3)
CREAT SERPL-MCNC: 0.73 MG/DL — SIGNIFICANT CHANGE UP (ref 0.5–1.3)
EGFR: 101 ML/MIN/1.73M2 — SIGNIFICANT CHANGE UP
EGFR: 90 ML/MIN/1.73M2 — SIGNIFICANT CHANGE UP
EGFR: 94 ML/MIN/1.73M2 — SIGNIFICANT CHANGE UP
EGFR: 97 ML/MIN/1.73M2 — SIGNIFICANT CHANGE UP
EGFR: 98 ML/MIN/1.73M2 — SIGNIFICANT CHANGE UP
GLUCOSE SERPL-MCNC: 106 MG/DL — HIGH (ref 70–99)
GLUCOSE SERPL-MCNC: 106 MG/DL — HIGH (ref 70–99)
GLUCOSE SERPL-MCNC: 112 MG/DL — HIGH (ref 70–99)
GLUCOSE SERPL-MCNC: 122 MG/DL — HIGH (ref 70–99)
GLUCOSE SERPL-MCNC: 89 MG/DL — SIGNIFICANT CHANGE UP (ref 70–99)
HCT VFR BLD CALC: 40.4 % — SIGNIFICANT CHANGE UP (ref 39–50)
HGB BLD-MCNC: 13.7 G/DL — SIGNIFICANT CHANGE UP (ref 13–17)
MAGNESIUM SERPL-MCNC: 2 MG/DL — SIGNIFICANT CHANGE UP (ref 1.6–2.6)
MCHC RBC-ENTMCNC: 30.4 PG — SIGNIFICANT CHANGE UP (ref 27–34)
MCHC RBC-ENTMCNC: 33.9 GM/DL — SIGNIFICANT CHANGE UP (ref 32–36)
MCV RBC AUTO: 89.8 FL — SIGNIFICANT CHANGE UP (ref 80–100)
NRBC # BLD: 0 /100 WBCS — SIGNIFICANT CHANGE UP (ref 0–0)
OSMOLALITY UR: 732 MOSM/KG — SIGNIFICANT CHANGE UP (ref 300–900)
PHOSPHATE SERPL-MCNC: 2.5 MG/DL — SIGNIFICANT CHANGE UP (ref 2.5–4.5)
PLATELET # BLD AUTO: 187 K/UL — SIGNIFICANT CHANGE UP (ref 150–400)
POTASSIUM SERPL-MCNC: 3.7 MMOL/L — SIGNIFICANT CHANGE UP (ref 3.5–5.3)
POTASSIUM SERPL-MCNC: 3.9 MMOL/L — SIGNIFICANT CHANGE UP (ref 3.5–5.3)
POTASSIUM SERPL-MCNC: 4 MMOL/L — SIGNIFICANT CHANGE UP (ref 3.5–5.3)
POTASSIUM SERPL-MCNC: 4.3 MMOL/L — SIGNIFICANT CHANGE UP (ref 3.5–5.3)
POTASSIUM SERPL-MCNC: 4.5 MMOL/L — SIGNIFICANT CHANGE UP (ref 3.5–5.3)
POTASSIUM SERPL-SCNC: 3.7 MMOL/L — SIGNIFICANT CHANGE UP (ref 3.5–5.3)
POTASSIUM SERPL-SCNC: 3.9 MMOL/L — SIGNIFICANT CHANGE UP (ref 3.5–5.3)
POTASSIUM SERPL-SCNC: 4 MMOL/L — SIGNIFICANT CHANGE UP (ref 3.5–5.3)
POTASSIUM SERPL-SCNC: 4.3 MMOL/L — SIGNIFICANT CHANGE UP (ref 3.5–5.3)
POTASSIUM SERPL-SCNC: 4.5 MMOL/L — SIGNIFICANT CHANGE UP (ref 3.5–5.3)
RBC # BLD: 4.5 M/UL — SIGNIFICANT CHANGE UP (ref 4.2–5.8)
RBC # FLD: 12.4 % — SIGNIFICANT CHANGE UP (ref 10.3–14.5)
SODIUM SERPL-SCNC: 129 MMOL/L — LOW (ref 135–145)
SODIUM SERPL-SCNC: 130 MMOL/L — LOW (ref 135–145)
SODIUM SERPL-SCNC: 132 MMOL/L — LOW (ref 135–145)
SODIUM SERPL-SCNC: 133 MMOL/L — LOW (ref 135–145)
SODIUM SERPL-SCNC: 134 MMOL/L — LOW (ref 135–145)
SODIUM UR-SCNC: 151 MMOL/L — SIGNIFICANT CHANGE UP
WBC # BLD: 5.05 K/UL — SIGNIFICANT CHANGE UP (ref 3.8–10.5)
WBC # FLD AUTO: 5.05 K/UL — SIGNIFICANT CHANGE UP (ref 3.8–10.5)

## 2024-03-28 PROCEDURE — 99232 SBSQ HOSP IP/OBS MODERATE 35: CPT | Mod: GC

## 2024-03-28 PROCEDURE — 99232 SBSQ HOSP IP/OBS MODERATE 35: CPT

## 2024-03-28 RX ORDER — POTASSIUM CHLORIDE 20 MEQ
20 PACKET (EA) ORAL ONCE
Refills: 0 | Status: COMPLETED | OUTPATIENT
Start: 2024-03-28 | End: 2024-03-28

## 2024-03-28 RX ADMIN — CHLORHEXIDINE GLUCONATE 1 APPLICATION(S): 213 SOLUTION TOPICAL at 12:00

## 2024-03-28 RX ADMIN — CARBIDOPA AND LEVODOPA 1 TABLET(S): 25; 100 TABLET ORAL at 06:00

## 2024-03-28 RX ADMIN — ATORVASTATIN CALCIUM 40 MILLIGRAM(S): 80 TABLET, FILM COATED ORAL at 21:53

## 2024-03-28 RX ADMIN — Medication 20 MILLIEQUIVALENT(S): at 07:34

## 2024-03-28 RX ADMIN — SODIUM CHLORIDE 50 MILLILITER(S): 5 INJECTION, SOLUTION INTRAVENOUS at 07:00

## 2024-03-28 RX ADMIN — Medication 50 MILLIGRAM(S): at 06:00

## 2024-03-28 RX ADMIN — CARBIDOPA AND LEVODOPA 1 TABLET(S): 25; 100 TABLET ORAL at 21:53

## 2024-03-28 RX ADMIN — CARBIDOPA AND LEVODOPA 1 TABLET(S): 25; 100 TABLET ORAL at 14:45

## 2024-03-28 RX ADMIN — PANTOPRAZOLE SODIUM 40 MILLIGRAM(S): 20 TABLET, DELAYED RELEASE ORAL at 06:00

## 2024-03-28 RX ADMIN — ENOXAPARIN SODIUM 40 MILLIGRAM(S): 100 INJECTION SUBCUTANEOUS at 21:53

## 2024-03-28 NOTE — PROGRESS NOTE ADULT - ASSESSMENT
Imp: Chronic hyponatremia, now improved. SIADH.  Na now at goal ~133, off hypertonic.    Off hypertonic on 3/28 am.       Plan:  Start Urena 15g daily. Will cont. monitoring serum Na and titrate as need it.   Cont. fluid restriction<1.2L daily   Encourage po solute intake.   Daily labs. High risk of worsening hyponatremia.   Once ready for dc, dc with Nephro or PCP f/u in 1wk for op monitoring of serum Na.

## 2024-03-28 NOTE — PROGRESS NOTE ADULT - ASSESSMENT
Mr. 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 Patient receiving hypertonic saline and showing continued improvement in sodium levels. Today plan to monitor with serial BMP. Will plan for PT to evaluate patient's mobility to establish appropriate disposition.

## 2024-03-28 NOTE — PROGRESS NOTE ADULT - PROBLEM SELECTOR PLAN 2
#Weakness  Episode of fall due to lower extremity weakness 3/24 AM when ambulating to bathroom. Denies syncope. Likely in setting of COVID-19 infection.   - CT head without acute pathology and neurology exam without focal deficit.   - TSH wnl. Orthostatic vitals wnl.     Plan:   - Fall precaution  - PT recs YOUNG; will have re-evaluation as patient note's feeling like he is more mobile and would prefer home PT if that is a reasonable option

## 2024-03-28 NOTE — PROGRESS NOTE ADULT - SUBJECTIVE AND OBJECTIVE BOX
ABBEY JOHNSON  83y  Male      Patient is a 83y old  Male who presents with a chief complaint of fall, weakness, covid-19 (28 Mar 2024 07:14)      INTERVAL HPI/OVERNIGHT EVENTS: Overnight 8 pm Na 126 and 11 pm Na 126. Nephrology informed and recommended to continue hypertonic saline at 50 cc/hr. This morning, patient denies any upper respiratory symptoms. He denies any CP, SOB, N/V/D/C or abdominal pain. Also does not report any headache, changes to vision, or dizziness. He is eager to work again with physical therapy as he wants to be more active and pursue home PT as opposed to [facility] if that is a reasonable option with his current conditioning.      Vital Signs Last 24 Hrs  T(C): 36.3 (28 Mar 2024 09:54), Max: 36.6 (27 Mar 2024 22:36)  T(F): 97.3 (28 Mar 2024 09:54), Max: 97.8 (27 Mar 2024 22:36)  HR: 75 (28 Mar 2024 12:15) (67 - 86)  BP: 157/67 (28 Mar 2024 12:15) (121/64 - 164/88)  BP(mean): 96 (28 Mar 2024 12:15) (87 - 118)  RR: 18 (28 Mar 2024 12:15) (18 - 21)  SpO2: 99% (28 Mar 2024 12:15) (97% - 99%)    Parameters below as of 28 Mar 2024 12:15  Patient On (Oxygen Delivery Method): room air    PHYSICAL EXAM:  GENERAL: NAD, well-groomed, well-developed;   HEAD:  Atraumatic, Normocephalic  EYES: EOMI, conjunctiva and sclera clear  ENMT: Moist mucous membranes, Good dentition, No lesions  NERVOUS SYSTEM:  Alert & Oriented X3, Good concentration  CHEST/LUNG: Clear to auscultation bilaterally; No rales, rhonchi, wheezing, or rubs  HEART: Regular rate and rhythm; No murmurs, rubs, or gallops  ABDOMEN: Soft, Nontender, Nondistended; Bowel sounds present  EXTREMITIES:  2+ Peripheral Pulses, No clubbing, cyanosis, or edema    Consultant(s) Notes Reviewed:  [x ] YES  [ ] NO  Care Discussed with Consultants/Other Providers [ x] YES  [ ] NO    LABS:                        13.7   5.05  )-----------( 187      ( 28 Mar 2024 05:30 )             40.4     03-28    129<L>  |  97  |  10  ----------------------------<  89  3.7   |  20<L>  |  0.50    Ca    9.1      28 Mar 2024 05:30  Phos  2.5     03-28  Mg     2.0     03-28        Urinalysis Basic - ( 28 Mar 2024 05:30 )    Color: x / Appearance: x / SG: x / pH: x  Gluc: 89 mg/dL / Ketone: x  / Bili: x / Urobili: x   Blood: x / Protein: x / Nitrite: x   Leuk Esterase: x / RBC: x / WBC x   Sq Epi: x / Non Sq Epi: x / Bacteria: x        CAPILLARY BLOOD GLUCOSE          RADIOLOGY & ADDITIONAL TESTS:    Imaging Personally Reviewed:  [ ] YES  [ ] NO ABBEY BAJWA  83y  Male    Hospital course:  Mr. 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 Episode of fall due to lower extremity weakness 3/24 AM when ambulating to bathroom. Denies syncope. CT head without acute pathology and neurology exam without focal deficit. TSH wnl. Orthostatic vitals wnl. PT evaluated patient and based on initial evaluation recommended sub-acute rehab; If patient were to go home will benefit from home PT and continues assist from [facility]. Patient had a similar episode of hyponatremia in 2020 with COVID. Admitted to telemetry as required hypertonic saline at 50 cc/hr. Was monitored with serial BMPs and showed an appropriate rise in Na to 134 on 3/28 afternoon BMP. Patient has been mentating well, denies any upper respiratory symptoms, dizziness, headaches or nausea. Given improvement in Na in appropriate timeframe, can continue further medical management on [facility].       Patient is a 83y old  Male who presents with a chief complaint of fall, weakness, covid-19 (28 Mar 2024 07:14)      INTERVAL HPI/OVERNIGHT EVENTS: Overnight 8 pm Na 126 and 11 pm Na 126. Nephrology informed and recommended to continue hypertonic saline at 50 cc/hr. This morning, patient denies any upper respiratory symptoms. He denies any CP, SOB, N/V/D/C or abdominal pain. Also does not report any headache, changes to vision, or dizziness. He is eager to work again with physical therapy as he wants to be more active and pursue home PT as opposed to [facility] if that is a reasonable option with his current conditioning.      Vital Signs Last 24 Hrs  T(C): 36.3 (28 Mar 2024 09:54), Max: 36.6 (27 Mar 2024 22:36)  T(F): 97.3 (28 Mar 2024 09:54), Max: 97.8 (27 Mar 2024 22:36)  HR: 75 (28 Mar 2024 12:15) (67 - 86)  BP: 157/67 (28 Mar 2024 12:15) (121/64 - 164/88)  BP(mean): 96 (28 Mar 2024 12:15) (87 - 118)  RR: 18 (28 Mar 2024 12:15) (18 - 21)  SpO2: 99% (28 Mar 2024 12:15) (97% - 99%)    Parameters below as of 28 Mar 2024 12:15  Patient On (Oxygen Delivery Method): room air    PHYSICAL EXAM:  GENERAL: NAD, well-groomed, well-developed;   HEAD:  Atraumatic, Normocephalic  EYES: EOMI, conjunctiva and sclera clear  ENMT: Moist mucous membranes, Good dentition, No lesions  NERVOUS SYSTEM:  Alert & Oriented X3, Good concentration  CHEST/LUNG: Clear to auscultation bilaterally; No rales, rhonchi, wheezing, or rubs  HEART: Regular rate and rhythm; No murmurs, rubs, or gallops  ABDOMEN: Soft, Nontender, Nondistended; Bowel sounds present  EXTREMITIES:  2+ Peripheral Pulses, No clubbing, cyanosis, or edema    Consultant(s) Notes Reviewed:  [x ] YES  [ ] NO  Care Discussed with Consultants/Other Providers [ x] YES  [ ] NO    LABS:                        13.7   5.05  )-----------( 187      ( 28 Mar 2024 05:30 )             40.4     03-28    129<L>  |  97  |  10  ----------------------------<  89  3.7   |  20<L>  |  0.50    Ca    9.1      28 Mar 2024 05:30  Phos  2.5     03-28  Mg     2.0     03-28        Urinalysis Basic - ( 28 Mar 2024 05:30 )    Color: x / Appearance: x / SG: x / pH: x  Gluc: 89 mg/dL / Ketone: x  / Bili: x / Urobili: x   Blood: x / Protein: x / Nitrite: x   Leuk Esterase: x / RBC: x / WBC x   Sq Epi: x / Non Sq Epi: x / Bacteria: x        CAPILLARY BLOOD GLUCOSE          RADIOLOGY & ADDITIONAL TESTS:    Imaging Personally Reviewed:  [ ] YES  [ ] NO

## 2024-03-28 NOTE — PROGRESS NOTE ADULT - PROBLEM SELECTOR PLAN 1
Patient with chronic hyponatremia. Sodium decreased on 3/26 AM labs from 123 --> 120, suspected SIADH. Had a similar episode in 2020 while having COVID. History of weakness and fall, was given salt tablets at rehab facility and improved. UOsm >100 (ADH on), Mirela >30, euvolemic on exam, consistent with SIADH. AM cortisol wnl. As per nephrology recs, started on hypertonic saline at 50 cc/hr and has shown consistent improvement as sodium today is 129.     - 3% saline infusion at 50ccs/hour per nephrology recs, goal sodium 130-133 this morning   - C/w fluid restriction to 1L.   - Continue q4 hour BMP

## 2024-03-28 NOTE — PROGRESS NOTE ADULT - PROBLEM SELECTOR PLAN 9
Hb 11.6, no active signs of bleed. Hemoglobin has been normal since 3/26. Low concern at this time.   - Transfuse for Hb <7  - maintain active type and screen

## 2024-03-28 NOTE — PROGRESS NOTE ADULT - SUBJECTIVE AND OBJECTIVE BOX
Evaluated at bedside, stable, hyponatremia resolved, denies complains.     Allergies:  No Known Allergies    REVIEW OF SYSTEMS:  All other review of systems is negative unless indicated above.    PHYSICAL EXAM:  Constitutional: NAD  Respiratory: CTAB, no wheezes, rales or rhonchi  Cardiovascular: S1, S2, RRR  Gastrointestinal: ND/NT, +BS  Extremities:  No peripheral edema in LEs.   Neurological: A/O x 3    VITALS:  T(F): 97.5 (03-28-24 @ 17:53), Max: 97.8 (03-27-24 @ 22:36)  HR: 64 (03-28-24 @ 15:30)  BP: 160/80 (03-28-24 @ 15:30)  RR: 18 (03-28-24 @ 15:30)  SpO2: 95% (03-28-24 @ 15:30)  Wt(kg): --    03-26 @ 07:01  -  03-27 @ 07:00  --------------------------------------------------------  IN: 520 mL / OUT: 1100 mL / NET: -580 mL    03-27 @ 07:01  -  03-28 @ 07:00  --------------------------------------------------------  IN: 2224 mL / OUT: 1925 mL / NET: 299 mL    03-28 @ 07:01  -  03-28 @ 19:05  --------------------------------------------------------  IN: 50 mL / OUT: 0 mL / NET: 50 mL        LABS:  03-28    132<L>  |  101  |  13  ----------------------------<  112<H>  4.0   |  23  |  0.55    Ca    8.8      28 Mar 2024 16:58  Phos  2.5     03-28  Mg     2.0     03-28                            13.7   5.05  )-----------( 187      ( 28 Mar 2024 05:30 )             40.4          Evaluated at bedside, stable, hyponatremia resolved, denies complains.     Allergies:  No Known Allergies    REVIEW OF SYSTEMS:  All other review of systems is negative unless indicated above.    PHYSICAL EXAM:  Constitutional: NAD  Respiratory: CTAB, no wheezes, rales or rhonchi  Cardiovascular: S1, S2, RRR  Gastrointestinal: ND/NT, +BS  Extremities:  No peripheral edema in LEs.   Neurological: A/O x 3    VITALS:  T(F): 97.4 (03-28-24 @ 20:41), Max: 97.8 (03-27-24 @ 22:36)  HR: 67 (03-28-24 @ 20:22)  BP: 129/63 (03-28-24 @ 20:22)  RR: 20 (03-28-24 @ 20:22)  SpO2: 95% (03-28-24 @ 15:30)  Wt(kg): --    03-26 @ 07:01  -  03-27 @ 07:00  --------------------------------------------------------  IN: 520 mL / OUT: 1100 mL / NET: -580 mL    03-27 @ 07:01  -  03-28 @ 07:00  --------------------------------------------------------  IN: 2224 mL / OUT: 1925 mL / NET: 299 mL    03-28 @ 07:01  -  03-28 @ 21:20  --------------------------------------------------------  IN: 50 mL / OUT: 350 mL / NET: -300 mL          LABS:  03-28    132<L>  |  101  |  13  ----------------------------<  112<H>  4.0   |  23  |  0.55    Ca    8.8      28 Mar 2024 16:58  Phos  2.5     03-28  Mg     2.0     03-28                            13.7   5.05  )-----------( 187      ( 28 Mar 2024 05:30 )             40.4         atorvastatin 40 milliGRAM(s) Oral at bedtime  benzocaine/menthol Lozenge 1 Lozenge Oral two times a day PRN  carbidopa/levodopa  25/100 1 Tablet(s) Oral three times a day  chlorhexidine 2% Cloths 1 Application(s) Topical daily  enoxaparin Injectable 40 milliGRAM(s) SubCutaneous every 24 hours  metoprolol succinate ER 50 milliGRAM(s) Oral daily  pantoprazole    Tablet 40 milliGRAM(s) Oral before breakfast

## 2024-03-29 LAB
ADD ON TEST-SPECIMEN IN LAB: SIGNIFICANT CHANGE UP
ANION GAP SERPL CALC-SCNC: 4 MMOL/L — LOW (ref 5–17)
ANION GAP SERPL CALC-SCNC: 7 MMOL/L — SIGNIFICANT CHANGE UP (ref 5–17)
BLD GP AB SCN SERPL QL: NEGATIVE — SIGNIFICANT CHANGE UP
BUN SERPL-MCNC: 18 MG/DL — SIGNIFICANT CHANGE UP (ref 7–23)
BUN SERPL-MCNC: 33 MG/DL — HIGH (ref 7–23)
CALCIUM SERPL-MCNC: 9.1 MG/DL — SIGNIFICANT CHANGE UP (ref 8.4–10.5)
CALCIUM SERPL-MCNC: 9.2 MG/DL — SIGNIFICANT CHANGE UP (ref 8.4–10.5)
CHLORIDE SERPL-SCNC: 101 MMOL/L — SIGNIFICANT CHANGE UP (ref 96–108)
CHLORIDE SERPL-SCNC: 99 MMOL/L — SIGNIFICANT CHANGE UP (ref 96–108)
CO2 SERPL-SCNC: 25 MMOL/L — SIGNIFICANT CHANGE UP (ref 22–31)
CO2 SERPL-SCNC: 27 MMOL/L — SIGNIFICANT CHANGE UP (ref 22–31)
CREAT SERPL-MCNC: 0.6 MG/DL — SIGNIFICANT CHANGE UP (ref 0.5–1.3)
CREAT SERPL-MCNC: 0.71 MG/DL — SIGNIFICANT CHANGE UP (ref 0.5–1.3)
EGFR: 90 ML/MIN/1.73M2 — SIGNIFICANT CHANGE UP
EGFR: 95 ML/MIN/1.73M2 — SIGNIFICANT CHANGE UP
GLUCOSE SERPL-MCNC: 109 MG/DL — HIGH (ref 70–99)
GLUCOSE SERPL-MCNC: 115 MG/DL — HIGH (ref 70–99)
MAGNESIUM SERPL-MCNC: 2 MG/DL — SIGNIFICANT CHANGE UP (ref 1.6–2.6)
OSMOLALITY UR: 827 MOSM/KG — SIGNIFICANT CHANGE UP (ref 300–900)
PHOSPHATE SERPL-MCNC: 2.9 MG/DL — SIGNIFICANT CHANGE UP (ref 2.5–4.5)
POTASSIUM SERPL-MCNC: 3.9 MMOL/L — SIGNIFICANT CHANGE UP (ref 3.5–5.3)
POTASSIUM SERPL-MCNC: 4.1 MMOL/L — SIGNIFICANT CHANGE UP (ref 3.5–5.3)
POTASSIUM SERPL-SCNC: 3.9 MMOL/L — SIGNIFICANT CHANGE UP (ref 3.5–5.3)
POTASSIUM SERPL-SCNC: 4.1 MMOL/L — SIGNIFICANT CHANGE UP (ref 3.5–5.3)
RH IG SCN BLD-IMP: POSITIVE — SIGNIFICANT CHANGE UP
SODIUM SERPL-SCNC: 131 MMOL/L — LOW (ref 135–145)
SODIUM SERPL-SCNC: 132 MMOL/L — LOW (ref 135–145)
SODIUM UR-SCNC: 167 MMOL/L — SIGNIFICANT CHANGE UP
T4 FREE SERPL-MCNC: 1.59 NG/DL — SIGNIFICANT CHANGE UP (ref 0.93–1.7)

## 2024-03-29 PROCEDURE — 99232 SBSQ HOSP IP/OBS MODERATE 35: CPT | Mod: GC

## 2024-03-29 PROCEDURE — 99233 SBSQ HOSP IP/OBS HIGH 50: CPT | Mod: GC

## 2024-03-29 RX ORDER — METOPROLOL TARTRATE 50 MG
50 TABLET ORAL EVERY 24 HOURS
Refills: 0 | Status: DISCONTINUED | OUTPATIENT
Start: 2024-03-30 | End: 2024-03-30

## 2024-03-29 RX ORDER — AMLODIPINE BESYLATE 2.5 MG/1
5 TABLET ORAL AT BEDTIME
Refills: 0 | Status: DISCONTINUED | OUTPATIENT
Start: 2024-03-29 | End: 2024-03-30

## 2024-03-29 RX ORDER — UREA 15 G
15 POWDER IN PACKET (EA) ORAL DAILY
Refills: 0 | Status: DISCONTINUED | OUTPATIENT
Start: 2024-03-29 | End: 2024-03-30

## 2024-03-29 RX ORDER — METOPROLOL TARTRATE 50 MG
100 TABLET ORAL DAILY
Refills: 0 | Status: DISCONTINUED | OUTPATIENT
Start: 2024-03-29 | End: 2024-03-29

## 2024-03-29 RX ADMIN — ATORVASTATIN CALCIUM 40 MILLIGRAM(S): 80 TABLET, FILM COATED ORAL at 22:13

## 2024-03-29 RX ADMIN — ENOXAPARIN SODIUM 40 MILLIGRAM(S): 100 INJECTION SUBCUTANEOUS at 22:12

## 2024-03-29 RX ADMIN — AMLODIPINE BESYLATE 5 MILLIGRAM(S): 2.5 TABLET ORAL at 22:13

## 2024-03-29 RX ADMIN — CARBIDOPA AND LEVODOPA 1 TABLET(S): 25; 100 TABLET ORAL at 06:02

## 2024-03-29 RX ADMIN — CARBIDOPA AND LEVODOPA 1 TABLET(S): 25; 100 TABLET ORAL at 22:13

## 2024-03-29 RX ADMIN — PANTOPRAZOLE SODIUM 40 MILLIGRAM(S): 20 TABLET, DELAYED RELEASE ORAL at 06:02

## 2024-03-29 RX ADMIN — Medication 15 GRAM(S): at 12:07

## 2024-03-29 RX ADMIN — Medication 50 MILLIGRAM(S): at 06:02

## 2024-03-29 RX ADMIN — CARBIDOPA AND LEVODOPA 1 TABLET(S): 25; 100 TABLET ORAL at 14:45

## 2024-03-29 RX ADMIN — CHLORHEXIDINE GLUCONATE 1 APPLICATION(S): 213 SOLUTION TOPICAL at 12:07

## 2024-03-29 NOTE — PROGRESS NOTE ADULT - PROVIDER SPECIALTY LIST ADULT
Internal Medicine
Nephrology
Internal Medicine

## 2024-03-29 NOTE — PROGRESS NOTE ADULT - PROBLEM SELECTOR PLAN 5
On carbidopa-levodopa  TID. Follows neurologist Dr. Belrin Sanchez  - Continue carbidopa-levodopa  TID

## 2024-03-29 NOTE — PROGRESS NOTE ADULT - SUBJECTIVE AND OBJECTIVE BOX
###INCOMPLETE###    Mr. 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 Prior to hospitalization, he had and episode of fall due to lower extremity weakness 3/24 AM when ambulating to bathroom, CT head on admission wnl. Orthostatic vitals wnl. Stepped up to telemetry on Tuesday,  Admitted to telemetry as required hypertonic saline at 50 cc/hr. Was monitored with serial BMPs and showed an appropriate rise in Na to 134 on 3/28 afternoon BMP. Hypertonic saline discontinued and UreaNa started. Na monitored and steady at 132. Patient has been mentating well, denies any upper respiratory symptoms, dizziness, headaches or nausea. Given improvement in Na in appropriate timeframe, can continue further medical management on [facility]      O/N Events:    Subjective/ROS: Patient seen and examined at bedside. Denies fevers, chills, HA, CP, SOB, n/v, changes in bowel/urinary habits.  12pt ROS otherwise negative.    VITALS  Vital Signs Last 24 Hrs  T(C): 36.6 (29 Mar 2024 14:50), Max: 36.6 (29 Mar 2024 14:50)  T(F): 97.8 (29 Mar 2024 14:50), Max: 97.8 (29 Mar 2024 14:50)  HR: 60 (29 Mar 2024 12:10) (57 - 67)  BP: 155/74 (29 Mar 2024 12:10) (129/63 - 170/74)  BP(mean): 107 (29 Mar 2024 12:10) (91 - 107)  RR: 18 (29 Mar 2024 12:10) (18 - 21)  SpO2: 93% (29 Mar 2024 12:10) (93% - 99%)    Parameters below as of 29 Mar 2024 12:10  Patient On (Oxygen Delivery Method): room air        CAPILLARY BLOOD GLUCOSE          PHYSICAL EXAM  General: NAD  Head: NC/AT; MMM; PERRL; EOMI;  Neck: Supple; no JVD  Respiratory: CTAB; no wheezes/rales/rhonchi  Cardiovascular: Regular rhythm/rate; S1/S2+, no murmurs, rubs gallops   Gastrointestinal: Soft; NTND; bowel sounds normal and present  Extremities: WWP; no edema/cyanosis  Neurological: A&Ox3, CNII-XII grossly intact; no obvious focal deficits    MEDICATIONS  (STANDING):  amLODIPine   Tablet 5 milliGRAM(s) Oral at bedtime  atorvastatin 40 milliGRAM(s) Oral at bedtime  carbidopa/levodopa  25/100 1 Tablet(s) Oral three times a day  chlorhexidine 2% Cloths 1 Application(s) Topical daily  enoxaparin Injectable 40 milliGRAM(s) SubCutaneous every 24 hours  pantoprazole    Tablet 40 milliGRAM(s) Oral before breakfast  urea Oral Powder 15 Gram(s) Oral daily    MEDICATIONS  (PRN):  benzocaine/menthol Lozenge 1 Lozenge Oral two times a day PRN Sore Throat      No Known Allergies      LABS                        13.7   5.05  )-----------( 187      ( 28 Mar 2024 05:30 )             40.4     03-29    131<L>  |  99  |  33<H>  ----------------------------<  115<H>  3.9   |  25  |  0.71    Ca    9.1      29 Mar 2024 14:33  Phos  2.9     03-29  Mg     2.0     03-29        Urinalysis Basic - ( 29 Mar 2024 14:33 )    Color: x / Appearance: x / SG: x / pH: x  Gluc: 115 mg/dL / Ketone: x  / Bili: x / Urobili: x   Blood: x / Protein: x / Nitrite: x   Leuk Esterase: x / RBC: x / WBC x   Sq Epi: x / Non Sq Epi: x / Bacteria: x              IMAGING/EKG/ETC   ###INCOMPLETE###    Mr. 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 Prior to hospitalization, he had and episode of fall due to lower extremity weakness 3/24 AM when ambulating to bathroom, CT head on admission wnl. Orthostatic vitals wnl. Stepped up to telemetry on Tuesday for treatment with hypertonic saline at 50 cc/hr. Was monitored with serial BMPs and showed an appropriate rise in Na to 134 on 3/28 afternoon BMP. Hypertonic saline discontinued and UreaNa started. Na monitored and steady at 132. Patient has been mentating well, denies any upper respiratory symptoms, dizziness, headaches or nausea. Given improvement in Na in appropriate timeframe, can continue further medical management on [facility]      O/N Events:    Subjective/ROS: Patient seen and examined at bedside. Denies fevers, chills, HA, CP, SOB, n/v, changes in bowel/urinary habits.  12pt ROS otherwise negative.    VITALS  Vital Signs Last 24 Hrs  T(C): 36.6 (29 Mar 2024 14:50), Max: 36.6 (29 Mar 2024 14:50)  T(F): 97.8 (29 Mar 2024 14:50), Max: 97.8 (29 Mar 2024 14:50)  HR: 60 (29 Mar 2024 12:10) (57 - 67)  BP: 155/74 (29 Mar 2024 12:10) (129/63 - 170/74)  BP(mean): 107 (29 Mar 2024 12:10) (91 - 107)  RR: 18 (29 Mar 2024 12:10) (18 - 21)  SpO2: 93% (29 Mar 2024 12:10) (93% - 99%)    Parameters below as of 29 Mar 2024 12:10  Patient On (Oxygen Delivery Method): room air        CAPILLARY BLOOD GLUCOSE          PHYSICAL EXAM  General: NAD  Head: NC/AT; MMM; PERRL; EOMI;  Neck: Supple; no JVD  Respiratory: CTAB; no wheezes/rales/rhonchi  Cardiovascular: Regular rhythm/rate; S1/S2+, no murmurs, rubs gallops   Gastrointestinal: Soft; NTND; bowel sounds normal and present  Extremities: WWP; no edema/cyanosis  Neurological: A&Ox3, CNII-XII grossly intact; no obvious focal deficits    MEDICATIONS  (STANDING):  amLODIPine   Tablet 5 milliGRAM(s) Oral at bedtime  atorvastatin 40 milliGRAM(s) Oral at bedtime  carbidopa/levodopa  25/100 1 Tablet(s) Oral three times a day  chlorhexidine 2% Cloths 1 Application(s) Topical daily  enoxaparin Injectable 40 milliGRAM(s) SubCutaneous every 24 hours  pantoprazole    Tablet 40 milliGRAM(s) Oral before breakfast  urea Oral Powder 15 Gram(s) Oral daily    MEDICATIONS  (PRN):  benzocaine/menthol Lozenge 1 Lozenge Oral two times a day PRN Sore Throat      No Known Allergies      LABS                        13.7   5.05  )-----------( 187      ( 28 Mar 2024 05:30 )             40.4     03-29    131<L>  |  99  |  33<H>  ----------------------------<  115<H>  3.9   |  25  |  0.71    Ca    9.1      29 Mar 2024 14:33  Phos  2.9     03-29  Mg     2.0     03-29        Urinalysis Basic - ( 29 Mar 2024 14:33 )    Color: x / Appearance: x / SG: x / pH: x  Gluc: 115 mg/dL / Ketone: x  / Bili: x / Urobili: x   Blood: x / Protein: x / Nitrite: x   Leuk Esterase: x / RBC: x / WBC x   Sq Epi: x / Non Sq Epi: x / Bacteria: x              IMAGING/EKG/ETC   ###INCOMPLETE###    Mr. 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 Prior to hospitalization, he had and episode of fall due to lower extremity weakness 3/24 AM when ambulating to bathroom, CT head on admission wnl. Orthostatic vitals wnl. Stepped up to telemetry on Tuesday for treatment with hypertonic saline at 50 cc/hr, in Na to 134 on 3/28 afternoon BMP now 131 and will be managed on the floors with Urena. Patient has been mentating well, denies any upper respiratory symptoms, dizziness, headaches or nausea. Given improvement in Na in appropriate timeframe, can continue further medical management on [facility] for dispo planning.     Subjective/ROS: Patient seen and examined at bedside, reports feeling well. He spent some time in the chair today and had a bowel movement. Otherwise, is mostly preoccupied about his dispo planning. Denies fevers, chills, HA, CP, SOB, n/v, changes in bowel/urinary habits.  12pt ROS otherwise negative.    VITALS  Vital Signs Last 24 Hrs  T(C): 36.6 (29 Mar 2024 14:50), Max: 36.6 (29 Mar 2024 14:50)  T(F): 97.8 (29 Mar 2024 14:50), Max: 97.8 (29 Mar 2024 14:50)  HR: 60 (29 Mar 2024 12:10) (57 - 67)  BP: 155/74 (29 Mar 2024 12:10) (129/63 - 170/74)  BP(mean): 107 (29 Mar 2024 12:10) (91 - 107)  RR: 18 (29 Mar 2024 12:10) (18 - 21)  SpO2: 93% (29 Mar 2024 12:10) (93% - 99%)    Parameters below as of 29 Mar 2024 12:10  Patient On (Oxygen Delivery Method): room air        CAPILLARY BLOOD GLUCOSE          PHYSICAL EXAM  General: NAD, elderly man   Head: NC/AT; MMM; PERRL; EOMI;  Neck: Supple; no JVD  Respiratory: CTAB; no wheezes/rales/rhonchi  Cardiovascular: Regular rhythm/rate; S1/S2+, no murmurs, rubs gallops   Gastrointestinal: Soft; NTND; bowel sounds normal and present  Extremities: WWP; no edema/cyanosis  Neurological: A&Ox3, CNII-XII grossly intact; no obvious focal deficits    MEDICATIONS  (STANDING):  amLODIPine   Tablet 5 milliGRAM(s) Oral at bedtime  atorvastatin 40 milliGRAM(s) Oral at bedtime  carbidopa/levodopa  25/100 1 Tablet(s) Oral three times a day  chlorhexidine 2% Cloths 1 Application(s) Topical daily  enoxaparin Injectable 40 milliGRAM(s) SubCutaneous every 24 hours  pantoprazole    Tablet 40 milliGRAM(s) Oral before breakfast  urea Oral Powder 15 Gram(s) Oral daily    MEDICATIONS  (PRN):  benzocaine/menthol Lozenge 1 Lozenge Oral two times a day PRN Sore Throat      No Known Allergies      LABS                        13.7   5.05  )-----------( 187      ( 28 Mar 2024 05:30 )             40.4     03-29    131<L>  |  99  |  33<H>  ----------------------------<  115<H>  3.9   |  25  |  0.71    Ca    9.1      29 Mar 2024 14:33  Phos  2.9     03-29  Mg     2.0     03-29        Urinalysis Basic - ( 29 Mar 2024 14:33 )    Color: x / Appearance: x / SG: x / pH: x  Gluc: 115 mg/dL / Ketone: x  / Bili: x / Urobili: x   Blood: x / Protein: x / Nitrite: x   Leuk Esterase: x / RBC: x / WBC x   Sq Epi: x / Non Sq Epi: x / Bacteria: x              IMAGING/EKG/ETC   ----MED to   Mr. 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 Prior to hospitalization, he had and episode of fall due to lower extremity weakness 3/24 AM when ambulating to bathroom, CT head on admission wnl. Orthostatic vitals wnl. Stepped up to telemetry on Tuesday for treatment with hypertonic saline at 50 cc/hr, in Na to 134 on 3/28 afternoon BMP now 131 and will be managed on the floors with Urena. Patient has been mentating well, denies any upper respiratory symptoms, dizziness, headaches or nausea. Given improvement in Na in appropriate timeframe, can continue further medical management on [facility] for dispo planning.     Subjective/ROS: Patient seen and examined at bedside, reports feeling well. He spent some time in the chair today and had a bowel movement. Otherwise, is mostly preoccupied about his dispo planning. Denies fevers, chills, HA, CP, SOB, n/v, changes in bowel/urinary habits.  12pt ROS otherwise negative.    VITALS  Vital Signs Last 24 Hrs  T(C): 36.6 (29 Mar 2024 14:50), Max: 36.6 (29 Mar 2024 14:50)  T(F): 97.8 (29 Mar 2024 14:50), Max: 97.8 (29 Mar 2024 14:50)  HR: 60 (29 Mar 2024 12:10) (57 - 67)  BP: 155/74 (29 Mar 2024 12:10) (129/63 - 170/74)  BP(mean): 107 (29 Mar 2024 12:10) (91 - 107)  RR: 18 (29 Mar 2024 12:10) (18 - 21)  SpO2: 93% (29 Mar 2024 12:10) (93% - 99%)    Parameters below as of 29 Mar 2024 12:10  Patient On (Oxygen Delivery Method): room air        CAPILLARY BLOOD GLUCOSE          PHYSICAL EXAM  General: NAD, elderly man   Head: NC/AT; MMM; PERRL; EOMI;  Neck: Supple; no JVD  Respiratory: CTAB; no wheezes/rales/rhonchi  Cardiovascular: Regular rhythm/rate; S1/S2+, no murmurs, rubs gallops   Gastrointestinal: Soft; NTND; bowel sounds normal and present  Extremities: WWP; no edema/cyanosis  Neurological: A&Ox3, CNII-XII grossly intact; no obvious focal deficits    MEDICATIONS  (STANDING):  amLODIPine   Tablet 5 milliGRAM(s) Oral at bedtime  atorvastatin 40 milliGRAM(s) Oral at bedtime  carbidopa/levodopa  25/100 1 Tablet(s) Oral three times a day  chlorhexidine 2% Cloths 1 Application(s) Topical daily  enoxaparin Injectable 40 milliGRAM(s) SubCutaneous every 24 hours  pantoprazole    Tablet 40 milliGRAM(s) Oral before breakfast  urea Oral Powder 15 Gram(s) Oral daily    MEDICATIONS  (PRN):  benzocaine/menthol Lozenge 1 Lozenge Oral two times a day PRN Sore Throat      No Known Allergies      LABS                        13.7   5.05  )-----------( 187      ( 28 Mar 2024 05:30 )             40.4     03-29    131<L>  |  99  |  33<H>  ----------------------------<  115<H>  3.9   |  25  |  0.71    Ca    9.1      29 Mar 2024 14:33  Phos  2.9     03-29  Mg     2.0     03-29        Urinalysis Basic - ( 29 Mar 2024 14:33 )    Color: x / Appearance: x / SG: x / pH: x  Gluc: 115 mg/dL / Ketone: x  / Bili: x / Urobili: x   Blood: x / Protein: x / Nitrite: x   Leuk Esterase: x / RBC: x / WBC x   Sq Epi: x / Non Sq Epi: x / Bacteria: x              IMAGING/EKG/ETC

## 2024-03-29 NOTE — PROGRESS NOTE ADULT - ASSESSMENT
Mr. 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 Patient received hypertonic saline with appropriate improvement in sodium. Now transitioned to UreNa for ongoing management. Patient no longer has telemetry needs and would be appropriate to have continued medical management on [facility].  Mr. 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

## 2024-03-29 NOTE — PROGRESS NOTE ADULT - PROBLEM SELECTOR PLAN 4
Elevated blood pressures on admission with SBP 150s. States he was on Lisinopril and Metoprolol, however Lisinopril was discontinued. Pt is unsure why he is still on Metoprolol  - S/p nifedipine 30 mg XL   - c/w metoprolol 50 mg   - started amlodipine 5 mg Elevated blood pressures on admission with SBP 150s. States he was on Lisinopril and Metoprolol, however Lisinopril was discontinued.    Plan:   - c/w metoprolol 50 mg   - started amlodipine 5 mg

## 2024-03-29 NOTE — PROGRESS NOTE ADULT - ASSESSMENT
Mr. 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 Patient received hypertonic saline with appropriate improvement in sodium. Now transitioned to UreNa for ongoing management. Patient no longer has telemetry needs and would be appropriate to have continued medical management on [facility].

## 2024-03-29 NOTE — PROGRESS NOTE ADULT - PROBLEM SELECTOR PLAN 10
Fluids: s/p 1L NS  Electrolytes: replete to keep k>4 and Mg>2  Nutrition: Regular diet  DVT ppx: Lovenox  GI ppx: pantoprazole  Code status: Full code  Dispo: [facility] Fluids: per oral w/ fluid restriciton  Electrolytes: replete to keep k>4 and Mg>2  Nutrition: Regular diet  DVT ppx: Lovenox  GI ppx: pantoprazole  Code status: Full code  Dispo: [facility]

## 2024-03-29 NOTE — PROGRESS NOTE ADULT - PROBLEM SELECTOR PLAN 4
Elevated blood pressures on admission with SBP 150s. States he was on Lisinopril and Metoprolol, however Lisinopril was discontinued. Pt is unsure why he is still on Metoprolol  - S/p nifedipine 30 mg XL   - c/w metoprolol 50 mg   - started amlodipine 5 mg

## 2024-03-29 NOTE — PROGRESS NOTE ADULT - REASON FOR ADMISSION
fall, weakness, covid-19

## 2024-03-29 NOTE — PROGRESS NOTE ADULT - PROBLEM SELECTOR PLAN 1
Patient with chronic hyponatremia. Sodium decreased on 3/26 AM labs from 123 --> 120, suspected SIADH. Had a similar episode in 2020 while having COVID. History of weakness and fall, was given salt tablets at rehab facility and improved. UOsm >100 (ADH on), Mirela >30, euvolemic on exam, consistent with SIADH. AM cortisol wnl. As per nephrology recs, originally started on hypertonic saline at 50 cc/hr and has shown consistent improvement so discontinued and transitioned to urena.    - c/w urena 15 mg QD  - C/w fluid restriction to 1L.   - Continue q4 hour BMP Patient with chronic hyponatremia. Sodium decreased on 3/26 AM labs from 123 --> 120, suspected SIADH. Had a similar episode in 2020 . History of weakness and fall, was given salt tablets at rehab facility and improved.   AM cortisol wnl. TSH wnl.   Now s/p hypertonic saline in [place], stable on [place].     Plan:   - c/w urena 15 mg QD  - C/w fluid restriction to 1L.   - Daily BMP

## 2024-03-29 NOTE — PROGRESS NOTE ADULT - PROBLEM SELECTOR PLAN 1
Patient with chronic hyponatremia. Sodium decreased on 3/26 AM labs from 123 --> 120, suspected SIADH. Had a similar episode in 2020 while having COVID. History of weakness and fall, was given salt tablets at rehab facility and improved. UOsm >100 (ADH on), Mirela >30, euvolemic on exam, consistent with SIADH. AM cortisol wnl. As per nephrology recs, originally started on hypertonic saline at 50 cc/hr and has shown consistent improvement so discontinued and transitioned to urena.    - c/w urena 15 mg QD  - C/w fluid restriction to 1L.   - Continue q4 hour BMP

## 2024-03-29 NOTE — PROGRESS NOTE ADULT - ATTENDING COMMENTS
82yo man with hyponatremia, 132 on admit 3/24, down to 123 yesterday  and acute drop to 118 last PM when renal called  urine data c/w SIADH  Na 125 3/27 ongoing 3% saline yesterday- Na up to 132-   at our target  as above- can now manage with Urena    Admission: went to ED after a fall, found to have COVID PNA; underlying h/o Parkinson's
Parkinson's, COVID 19 with severe hyponatremia from SIADH, HTN  physical as above  Na increasing on the 3% saline  continue sinemet  continue metoprolol
Parkinson's, GERD, HTN with COVID 19 and severe hyponatremia from SIADH  physical as above  Na stable off hypertonic saline infusion  started urea  continue metoprolol, add amlodipine
Parkinson's, hyponatremia secondary to SIADHm HTN, COVID 19  physical as above  the Na is increasing at the desired rate given precipitous drop yesterday  COVID 19 for now is mild  continue metooprolol  continue sinemet
83 M with Parkinson's disease, vertigo, LBBB, and GERD presents with fall due to generalized weakness in the setting of COVID-19 infection. He developed hyponatremia likely due to SIADH in the setting of viral infection. He had similar episodes in the past. Physical exam as above. CT head negative for mass and bleed.   1. Hyponatremia  2. SIADH  3. COVID infection  4. Parkinson's  - 3% NaCl  - water restriction  - no indication for COVID treatment   - Sinemet
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 Serum Na improved after hypertonic saline.   Started on UreNa 15g qd .   Alert Oriented x 4   [ ] f/u AM Na,   [ ] f/u renal final recs on dosing of UreNa     If Serum Na remains stable on UreNa, may become medically ready this weekend.   If returning to home instead of going to [facility], would benefit from home help (insurance vs private hire)
83M with PMH of Parkinson's disease, LBBB, GERD, vertigo presenting with generalized weakness, diagnosed with COVID-19.      Physical exam  General: no acute distress, sitting up inbed  HEENT: ncat, mmm  cards: rrr, normal S1S2, no mrg  pulm: ctab, no wheeze, crackles, rales or rhonchi  ab: soft, nontender, nondistended, normoactive bowel sounds  ext: wwp, no edema  neuro: no cogwheel rigidity, speech is fluent, no facial asymmetry  skin: warm and dry, no obvious rashes or lesions    Plan  #COVID-19 - asymptomatic  #Generalized weakness  #Hyponatremia  No hypoxia or respiratory distress.  Has been evaluated by PT, and recommended for home PT with HHA services.  Pending OT consult.  CUrrently monitoring, and not requiring any treatment for COVID or supplemental oxygen.  Sodium 132, no symptoms. mild.  Repeat labs tomorrow, and initiate workup if <130.      38 minutes spent on this encounter, including face to face with patient, care coordination, review of chart and documentation.
83M with PMH of Parkinson's disease, LBBB, GERD, vertigo presenting with generalized weakness, diagnosed with COVID-19.      Physical exam  General: no acute distress, sitting up inbed  HEENT: ncat, mmm  cards: rrr, normal S1S2, no mrg  pulm: ctab, no wheeze, crackles, rales or rhonchi  ab: soft, nontender, nondistended, normoactive bowel sounds  ext: wwp, no edema  neuro: no cogwheel rigidity, speech is fluent, no facial asymmetry  skin: warm and dry, no obvious rashes or lesions    Plan  #COVID-19 - asymptomatic  #Generalized weakness  #Hyponatremia  No hypoxia or respiratory distress.  Has been evaluated by PT, and recommended for home PT with HHA services.   Continues to be asymptomatic from covid  Sodium 123-->120, started on fluid restriction 1.2L, no symptoms. mild.  patient reports that he has history of hyponatremia in the past, and has been on a fluid restriction before.  Will be consulting nephrology.  Urine osms and urine sodium consistent with SIADH, likely in the setting of COVID.      35 minutes spent on this encounter, including face to face with patient, care coordination, review of chart and documentation.

## 2024-03-29 NOTE — PROGRESS NOTE ADULT - PROBLEM SELECTOR PLAN 5
On carbidopa-levodopa  TID. Follows neurologist Dr. Brelin Sanchez  - Continue carbidopa-levodopa  TID

## 2024-03-29 NOTE — PROGRESS NOTE ADULT - SUBJECTIVE AND OBJECTIVE BOX
ABBEY BAJWA  83y  Male      Patient is a 83y old  Male who presents with a chief complaint of fall, weakness, covid-19 (29 Mar 2024 07:00)    ---------------TRANSFER [facility] to [facility]---------------------  Hospital course:  Mr. 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 Episode of fall due to lower extremity weakness 3/24 AM when ambulating to bathroom. Denies syncope. CT head without acute pathology and neurology exam without focal deficit. TSH wnl. Orthostatic vitals wnl. PT evaluated patient and based on initial evaluation recommended sub-acute rehab; If patient were to go home will benefit from home PT and continues assist from A services. Patient had a similar episode of hyponatremia in 2020 with COVID. Admitted to telemetry as required hypertonic saline at 50 cc/hr. Was monitored with serial BMPs and showed an appropriate rise in Na to 134 on 3/28 afternoon BMP. Hypertonic saline discontinued and UreaNa started. Na monitored and steady at 132. Patient has been mentating well, denies any upper respiratory symptoms, dizziness, headaches or nausea. Given improvement in Na in appropriate timeframe, can continue further medical management on [facility]      INTERVAL HPI/OVERNIGHT EVENTS: NIDIA overnight. This morning patient does not have any acute complaints. Has been denying any upper respiratory symptoms.     Vital Signs Last 24 Hrs  T(C): 36.2 (29 Mar 2024 10:01), Max: 36.5 (28 Mar 2024 14:34)  T(F): 97.1 (29 Mar 2024 10:01), Max: 97.7 (28 Mar 2024 14:34)  HR: 57 (29 Mar 2024 08:29) (57 - 75)  BP: 140/79 (29 Mar 2024 08:29) (129/63 - 170/74)  BP(mean): 101 (29 Mar 2024 08:29) (91 - 111)  RR: 18 (29 Mar 2024 08:29) (18 - 21)  SpO2: 98% (29 Mar 2024 08:29) (95% - 99%)    Parameters below as of 29 Mar 2024 08:29  Patient On (Oxygen Delivery Method): room air    PHYSICAL EXAM:  GENERAL: NAD, well-groomed, well-developed;   HEAD:  Atraumatic, Normocephalic  EYES: EOMI, conjunctiva and sclera clear  ENMT: Moist mucous membranes, Good dentition, No lesions  NERVOUS SYSTEM:  Alert & Oriented X3, Good concentration  CHEST/LUNG: Clear to auscultation bilaterally; No rales, rhonchi, wheezing, or rubs  HEART: Regular rate and rhythm; No murmurs, rubs, or gallops  ABDOMEN: Soft, Nontender, Nondistended; Bowel sounds present  EXTREMITIES:  2+ Peripheral Pulses, No clubbing, cyanosis, or edema    Consultant(s) Notes Reviewed:  [x ] YES  [ ] NO  Care Discussed with Consultants/Other Providers [ x] YES  [ ] NO    LABS:                        13.7   5.05  )-----------( 187      ( 28 Mar 2024 05:30 )             40.4     03-29    132<L>  |  101  |  18  ----------------------------<  109<H>  4.1   |  27  |  0.60    Ca    9.2      29 Mar 2024 05:30  Phos  2.9     03-29  Mg     2.0     03-29        Urinalysis Basic - ( 29 Mar 2024 05:30 )    Color: x / Appearance: x / SG: x / pH: x  Gluc: 109 mg/dL / Ketone: x  / Bili: x / Urobili: x   Blood: x / Protein: x / Nitrite: x   Leuk Esterase: x / RBC: x / WBC x   Sq Epi: x / Non Sq Epi: x / Bacteria: x        CAPILLARY BLOOD GLUCOSE          RADIOLOGY & ADDITIONAL TESTS:    Imaging Personally Reviewed:  [ ] YES  [ ] NO

## 2024-03-30 VITALS
OXYGEN SATURATION: 99 % | HEART RATE: 65 BPM | SYSTOLIC BLOOD PRESSURE: 157 MMHG | TEMPERATURE: 98 F | RESPIRATION RATE: 18 BRPM | DIASTOLIC BLOOD PRESSURE: 75 MMHG

## 2024-03-30 LAB
ANION GAP SERPL CALC-SCNC: 9 MMOL/L — SIGNIFICANT CHANGE UP (ref 5–17)
BASOPHILS # BLD AUTO: 0.03 K/UL — SIGNIFICANT CHANGE UP (ref 0–0.2)
BASOPHILS NFR BLD AUTO: 0.4 % — SIGNIFICANT CHANGE UP (ref 0–2)
BUN SERPL-MCNC: 23 MG/DL — SIGNIFICANT CHANGE UP (ref 7–23)
CALCIUM SERPL-MCNC: 9.2 MG/DL — SIGNIFICANT CHANGE UP (ref 8.4–10.5)
CHLORIDE SERPL-SCNC: 98 MMOL/L — SIGNIFICANT CHANGE UP (ref 96–108)
CO2 SERPL-SCNC: 25 MMOL/L — SIGNIFICANT CHANGE UP (ref 22–31)
CREAT SERPL-MCNC: 0.58 MG/DL — SIGNIFICANT CHANGE UP (ref 0.5–1.3)
EGFR: 96 ML/MIN/1.73M2 — SIGNIFICANT CHANGE UP
EOSINOPHIL # BLD AUTO: 0.38 K/UL — SIGNIFICANT CHANGE UP (ref 0–0.5)
EOSINOPHIL NFR BLD AUTO: 5.5 % — SIGNIFICANT CHANGE UP (ref 0–6)
GLUCOSE SERPL-MCNC: 97 MG/DL — SIGNIFICANT CHANGE UP (ref 70–99)
HCT VFR BLD CALC: 37.4 % — LOW (ref 39–50)
HGB BLD-MCNC: 12.9 G/DL — LOW (ref 13–17)
IMM GRANULOCYTES NFR BLD AUTO: 0.9 % — SIGNIFICANT CHANGE UP (ref 0–0.9)
LYMPHOCYTES # BLD AUTO: 1.49 K/UL — SIGNIFICANT CHANGE UP (ref 1–3.3)
LYMPHOCYTES # BLD AUTO: 21.8 % — SIGNIFICANT CHANGE UP (ref 13–44)
MAGNESIUM SERPL-MCNC: 1.7 MG/DL — SIGNIFICANT CHANGE UP (ref 1.6–2.6)
MCHC RBC-ENTMCNC: 31.1 PG — SIGNIFICANT CHANGE UP (ref 27–34)
MCHC RBC-ENTMCNC: 34.5 GM/DL — SIGNIFICANT CHANGE UP (ref 32–36)
MCV RBC AUTO: 90.1 FL — SIGNIFICANT CHANGE UP (ref 80–100)
MONOCYTES # BLD AUTO: 0.58 K/UL — SIGNIFICANT CHANGE UP (ref 0–0.9)
MONOCYTES NFR BLD AUTO: 8.5 % — SIGNIFICANT CHANGE UP (ref 2–14)
NEUTROPHILS # BLD AUTO: 4.31 K/UL — SIGNIFICANT CHANGE UP (ref 1.8–7.4)
NEUTROPHILS NFR BLD AUTO: 62.9 % — SIGNIFICANT CHANGE UP (ref 43–77)
NRBC # BLD: 0 /100 WBCS — SIGNIFICANT CHANGE UP (ref 0–0)
PHOSPHATE SERPL-MCNC: 2.9 MG/DL — SIGNIFICANT CHANGE UP (ref 2.5–4.5)
PLATELET # BLD AUTO: 213 K/UL — SIGNIFICANT CHANGE UP (ref 150–400)
POTASSIUM SERPL-MCNC: 4 MMOL/L — SIGNIFICANT CHANGE UP (ref 3.5–5.3)
POTASSIUM SERPL-SCNC: 4 MMOL/L — SIGNIFICANT CHANGE UP (ref 3.5–5.3)
RBC # BLD: 4.15 M/UL — LOW (ref 4.2–5.8)
RBC # FLD: 12.9 % — SIGNIFICANT CHANGE UP (ref 10.3–14.5)
SODIUM SERPL-SCNC: 132 MMOL/L — LOW (ref 135–145)
WBC # BLD: 6.85 K/UL — SIGNIFICANT CHANGE UP (ref 3.8–10.5)
WBC # FLD AUTO: 6.85 K/UL — SIGNIFICANT CHANGE UP (ref 3.8–10.5)

## 2024-03-30 PROCEDURE — 86850 RBC ANTIBODY SCREEN: CPT

## 2024-03-30 PROCEDURE — 97530 THERAPEUTIC ACTIVITIES: CPT

## 2024-03-30 PROCEDURE — 80053 COMPREHEN METABOLIC PANEL: CPT

## 2024-03-30 PROCEDURE — 99239 HOSP IP/OBS DSCHRG MGMT >30: CPT

## 2024-03-30 PROCEDURE — 99285 EMERGENCY DEPT VISIT HI MDM: CPT

## 2024-03-30 PROCEDURE — 86901 BLOOD TYPING SEROLOGIC RH(D): CPT

## 2024-03-30 PROCEDURE — 86900 BLOOD TYPING SEROLOGIC ABO: CPT

## 2024-03-30 PROCEDURE — 81001 URINALYSIS AUTO W/SCOPE: CPT

## 2024-03-30 PROCEDURE — 70450 CT HEAD/BRAIN W/O DYE: CPT | Mod: MC

## 2024-03-30 PROCEDURE — 82962 GLUCOSE BLOOD TEST: CPT

## 2024-03-30 PROCEDURE — 84100 ASSAY OF PHOSPHORUS: CPT

## 2024-03-30 PROCEDURE — 84443 ASSAY THYROID STIM HORMONE: CPT

## 2024-03-30 PROCEDURE — 84439 ASSAY OF FREE THYROXINE: CPT

## 2024-03-30 PROCEDURE — 87637 SARSCOV2&INF A&B&RSV AMP PRB: CPT

## 2024-03-30 PROCEDURE — 83935 ASSAY OF URINE OSMOLALITY: CPT

## 2024-03-30 PROCEDURE — 82533 TOTAL CORTISOL: CPT

## 2024-03-30 PROCEDURE — 97110 THERAPEUTIC EXERCISES: CPT

## 2024-03-30 PROCEDURE — 97116 GAIT TRAINING THERAPY: CPT

## 2024-03-30 PROCEDURE — 36415 COLL VENOUS BLD VENIPUNCTURE: CPT

## 2024-03-30 PROCEDURE — 71045 X-RAY EXAM CHEST 1 VIEW: CPT

## 2024-03-30 PROCEDURE — 84300 ASSAY OF URINE SODIUM: CPT

## 2024-03-30 PROCEDURE — 97161 PT EVAL LOW COMPLEX 20 MIN: CPT

## 2024-03-30 PROCEDURE — 97165 OT EVAL LOW COMPLEX 30 MIN: CPT

## 2024-03-30 PROCEDURE — 85027 COMPLETE CBC AUTOMATED: CPT

## 2024-03-30 PROCEDURE — 83735 ASSAY OF MAGNESIUM: CPT

## 2024-03-30 PROCEDURE — 85025 COMPLETE CBC W/AUTO DIFF WBC: CPT

## 2024-03-30 PROCEDURE — 80048 BASIC METABOLIC PNL TOTAL CA: CPT

## 2024-03-30 PROCEDURE — 83930 ASSAY OF BLOOD OSMOLALITY: CPT

## 2024-03-30 RX ORDER — UREA 15 G
3 POWDER IN PACKET (EA) ORAL
Qty: 3 | Refills: 0
Start: 2024-03-30 | End: 2024-04-01

## 2024-03-30 RX ORDER — UREA 15 G
1 POWDER IN PACKET (EA) ORAL
Qty: 0 | Refills: 0 | DISCHARGE
Start: 2024-03-30

## 2024-03-30 RX ADMIN — CARBIDOPA AND LEVODOPA 1 TABLET(S): 25; 100 TABLET ORAL at 14:47

## 2024-03-30 RX ADMIN — Medication 50 MILLIGRAM(S): at 07:08

## 2024-03-30 RX ADMIN — CARBIDOPA AND LEVODOPA 1 TABLET(S): 25; 100 TABLET ORAL at 07:08

## 2024-03-30 RX ADMIN — PANTOPRAZOLE SODIUM 40 MILLIGRAM(S): 20 TABLET, DELAYED RELEASE ORAL at 07:08

## 2024-03-30 NOTE — DISCHARGE NOTE NURSING/CASE MANAGEMENT/SOCIAL WORK - NSDCPEFALRISK_GEN_ALL_CORE
For information on Fall & Injury Prevention, visit: [email or web address] OR  [email or web address] OR  https://www.cdc.gov/steadi/patient.html

## 2024-03-30 NOTE — DISCHARGE NOTE NURSING/CASE MANAGEMENT/SOCIAL WORK - PATIENT PORTAL LINK FT
You can access the FollowMyHealth Patient Portal offered by [facility] by registering at the following website: [email or web address]. By joining [email or web address] FollowMyHealth portal, you will also be able to view your health information using other applications (apps) compatible with our system.

## 2024-04-04 DIAGNOSIS — K21.9 GASTRO-ESOPHAGEAL REFLUX DISEASE WITHOUT ESOPHAGITIS: ICD-10-CM

## 2024-04-04 DIAGNOSIS — R42 DIZZINESS AND GIDDINESS: ICD-10-CM

## 2024-04-04 DIAGNOSIS — Z91.81 HISTORY OF FALLING: ICD-10-CM

## 2024-04-04 DIAGNOSIS — I10 ESSENTIAL (PRIMARY) HYPERTENSION: ICD-10-CM

## 2024-04-04 DIAGNOSIS — U07.1 COVID-19: ICD-10-CM

## 2024-04-04 DIAGNOSIS — G20.A1 PARKINSON'S DISEASE WITHOUT DYSKINESIA, WITHOUT MENTION OF FLUCTUATIONS: ICD-10-CM

## 2024-04-04 DIAGNOSIS — I44.7 LEFT BUNDLE-BRANCH BLOCK, UNSPECIFIED: ICD-10-CM

## 2024-04-04 DIAGNOSIS — J20.8 ACUTE BRONCHITIS DUE TO OTHER SPECIFIED ORGANISMS: ICD-10-CM

## 2024-04-04 DIAGNOSIS — E22.2 SYNDROME OF INAPPROPRIATE SECRETION OF ANTIDIURETIC HORMONE: ICD-10-CM

## 2024-04-04 DIAGNOSIS — D64.9 ANEMIA, UNSPECIFIED: ICD-10-CM

## 2024-09-06 ENCOUNTER — EMERGENCY (EMERGENCY)
Facility: HOSPITAL | Age: 84
LOS: 1 days | Discharge: HOME | End: 2024-09-06
Attending: EMERGENCY MEDICINE | Admitting: EMERGENCY MEDICINE
Payer: MEDICARE

## 2024-09-06 VITALS
OXYGEN SATURATION: 98 % | DIASTOLIC BLOOD PRESSURE: 73 MMHG | HEART RATE: 61 BPM | TEMPERATURE: 98 F | WEIGHT: 151.02 LBS | RESPIRATION RATE: 16 BRPM | SYSTOLIC BLOOD PRESSURE: 163 MMHG | HEIGHT: 68 IN

## 2024-09-06 VITALS
OXYGEN SATURATION: 99 % | DIASTOLIC BLOOD PRESSURE: 69 MMHG | SYSTOLIC BLOOD PRESSURE: 152 MMHG | HEART RATE: 60 BPM | RESPIRATION RATE: 18 BRPM | TEMPERATURE: 98 F

## 2024-09-06 DIAGNOSIS — R11.2 NAUSEA WITH VOMITING, UNSPECIFIED: ICD-10-CM

## 2024-09-06 DIAGNOSIS — G20.A1 PARKINSON'S DISEASE WITHOUT DYSKINESIA, WITHOUT MENTION OF FLUCTUATIONS: ICD-10-CM

## 2024-09-06 DIAGNOSIS — K21.9 GASTRO-ESOPHAGEAL REFLUX DISEASE WITHOUT ESOPHAGITIS: ICD-10-CM

## 2024-09-06 DIAGNOSIS — Z23 ENCOUNTER FOR IMMUNIZATION: ICD-10-CM

## 2024-09-06 DIAGNOSIS — S01.01XA LACERATION WITHOUT FOREIGN BODY OF SCALP, INITIAL ENCOUNTER: ICD-10-CM

## 2024-09-06 DIAGNOSIS — S09.90XA UNSPECIFIED INJURY OF HEAD, INITIAL ENCOUNTER: ICD-10-CM
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
## 2024-09-06 DIAGNOSIS — Z94.7 CORNEAL TRANSPLANT STATUS: Chronic | ICD-10-CM

## 2024-09-06 DIAGNOSIS — Z98.49 CATARACT EXTRACTION STATUS, UNSPECIFIED EYE: Chronic | ICD-10-CM

## 2024-09-06 PROCEDURE — 70450 CT HEAD/BRAIN W/O DYE: CPT | Mod: 26,MC

## 2024-09-06 PROCEDURE — 12001 RPR S/N/AX/GEN/TRNK 2.5CM/<: CPT

## 2024-09-06 PROCEDURE — 99284 EMERGENCY DEPT VISIT MOD MDM: CPT | Mod: FS,25

## 2024-09-06 PROCEDURE — 90471 IMMUNIZATION ADMIN: CPT

## 2024-09-06 PROCEDURE — 99284 EMERGENCY DEPT VISIT MOD MDM: CPT | Mod: 25

## 2024-09-06 PROCEDURE — 70450 CT HEAD/BRAIN W/O DYE: CPT | Mod: MC

## 2024-09-06 PROCEDURE — 90715 TDAP VACCINE 7 YRS/> IM: CPT

## 2024-09-06 PROCEDURE — 99283 EMERGENCY DEPT VISIT LOW MDM: CPT | Mod: 25

## 2024-09-06 RX ORDER — TETANUS TOXOID, REDUCED DIPHTHERIA TOXOID AND ACELLULAR PERTUSSIS VACCINE, ADSORBED 5; 2.5; 8; 8; 2.5 [IU]/.5ML; [IU]/.5ML; UG/.5ML; UG/.5ML; UG/.5ML
0.5 SUSPENSION INTRAMUSCULAR ONCE
Refills: 0 | Status: COMPLETED | OUTPATIENT
Start: 2024-09-06 | End: 2024-09-06

## 2024-09-06 RX ADMIN — TETANUS TOXOID, REDUCED DIPHTHERIA TOXOID AND ACELLULAR PERTUSSIS VACCINE, ADSORBED 0.5 MILLILITER(S): 5; 2.5; 8; 8; 2.5 SUSPENSION INTRAMUSCULAR at 10:46

## 2024-09-06 NOTE — ED PROVIDER NOTE - PHYSICAL EXAMINATION
Vitals reviewed  Gen: comfortable appearing at rest, in nad, speaking in full sentences  Skin: wwp, no rash   HEENT: nc,  + 2.5cm lac/hematoma to occipital scalp, no laceration, eomi, mmm  Neck/Back: no midline ttp/step off, no paraspinal ttp  CV: rrr, no audible m/r/g  Resp: symmetrical expansion, ctab, no w/r/r  Abd: nondistended/soft, nontender, no r/g  Ext: FROM throughout, no peripheral edema, no muscle or joint ttp, 5/5 strength all ext, SILT equal throughout, distal pulses 2+  Neuro: alert/oriented x3, no focal deficits

## 2024-09-06 NOTE — ED PROVIDER NOTE - OBJECTIVE STATEMENT
83 M here with HHA, pmh parkinson's disease, vertigo, LBBB, and GERD p/w head trauma s/p fall this morning.  pt had just opened door for HHA and upon turning around felt L knee give out and fell hitting back of head on wall then fell onto buttock.  no loc or use of AC.  last tetanus  unknown.  denies f/c, HA, dizziness, neck/back pain, chest pain, sob, palpitations, uri sxs, abd pain, nvd, urinary sxs, numbness/focal weakness, paresthesias, joint pain, or other injuries.

## 2024-09-06 NOTE — ED ADULT TRIAGE NOTE - CHIEF COMPLAINT QUOTE
Patient brought in by EMS c/o fall. Pt. was walking to answer door when he tripped and fell, hitting back of head. Laceration to back of head, no active bleeding. No LOC. No blood thinners. No dizziness/weakness.

## 2024-09-06 NOTE — ED PROVIDER NOTE - PATIENT PORTAL LINK FT
You can access the FollowMyHealth Patient Portal offered by [facility] by registering at the following website: [email or web address]. By joining [employer]’s FollowMyHealth portal, you will also be able to view your health information using other applications (apps) compatible with our system.

## 2024-09-06 NOTE — ED PROVIDER NOTE - CLINICAL SUMMARY MEDICAL DECISION MAKING FREE TEXT BOX
83 M here with HHA, pmh parkinson's disease, vertigo, LBBB, and GERD p/w head trauma s/p mechanical fall this morning.  on exam vss, comfortable appearing, no midline spinal ttp, + 2.5cm lac/hematoma to back of scalp, a/ox3, lungs ctab, hrrr, moving all ext, nvi.  r/o ich from head trauma.  will obtain ct head and plan for lac repair and update tetanus.

## 2024-09-06 NOTE — ED ADULT NURSE NOTE - NSFALLHARMRISKINTERV_ED_ALL_ED
Assistance OOB with selected safe patient handling equipment if applicable/Communicate risk of Fall with Harm to all staff, patient, and family/Monitor gait and stability/Provide patient with walking aids/Provide visual cue: red socks, yellow wristband, yellow gown, etc/Reinforce activity limits and safety measures with patient and family/Bed in lowest position, wheels locked, appropriate side rails in place/Call bell, personal items and telephone in reach/Instruct patient to call for assistance before getting out of bed/chair/stretcher/Non-slip footwear applied when patient is off stretcher/[place] to call system/Physically safe environment - no spills, clutter or unnecessary equipment/Purposeful Proactive Rounding/Room/bathroom lighting operational, light cord in reach

## 2024-09-06 NOTE — ED PROVIDER NOTE - ATTENDING APP SHARED VISIT CONTRIBUTION OF CARE
83 with HHA hx parkisons vertigo gerd now with fall from standing height after turning and knee 'buckled'.  Hematoma /abrasion to occiput after fall and and head strike.  No other injury or pain.  No ttp.  Cspine nontender.  Nl neuro.  No deficits.  No ev of cord compression.  Plan CT head and reassess.

## 2024-09-06 NOTE — ED ADULT NURSE NOTE - OBJECTIVE STATEMENT
Pt is 83 y.o male pt BIBA from home s/p fall, pt states " I was trying to get back to my apartment I unlocked the door and for some reason my left leg just gave up and I fell". Pt denies LOC, blood thinner use. Pt is awake and alert and conversive in full sentences. Bleeding controlled with gauze wrapped around his head w/ laceration on the back of the head. Assessment ongoing.

## 2024-09-06 NOTE — ED PROCEDURE NOTE - NS ED PROCEDURE ASSISTED BY
Patient : Ezio Guillen Age: 71 year old Sex: male   MRN: [id number] Encounter Date: 6/7/2022      History     Chief Complaint   Patient presents with   • Arm Swelling     71 year-old male with past medical hx of HTN, a-fib, HLD, hyperparathyroidism and RA presents to ED with cc of left upper extremity pain. Patient describes severe left hand, wrist and forearm pain with associated swelling that began yesterday. He states the pain worsens with minimal movement. Patient denies any recent injuries or trauma to the area. States he has not taken any analgesics today. Patient denies fever, chest pain, SOB, cough, abd pain, nvd, weakness or other complaints.    Patient reports he has gout in his feet and ankles, never in his upper extremities and states his RA flares are typically in his knees, feet and shoulders.    Upon chart review, pt follows up with Dr Stark for his RA, last visit was on 4/28 and he was prescribed Hydroxychloroquine. Patient has frequent gout flares and takes Prednisone prn and Allopurinol for it daily. Last Prednisone dose yesterday. He states he is compliant with all his medications, including his Xarelto. Adds he rarely misses doses.          Allergies   Allergen Reactions   • Penicillins ANAPHYLAXIS       Current Discharge Medication List      Prior to Admission Medications    Details   predniSONE (DELTASONE) 5 MG tablet Take 1 tablet by mouth daily as needed (gout).  Qty: 30 tablet, Refills: 1      Azelastine HCl 137 MCG/SPRAY Solution Spray 1 spray in each nostril 2 times daily. To control nasal/ allergy symptoms.  Qty: 1 each, Refills: 0      acetaminophen (TYLENOL) 500 MG tablet TAKE 1-2 TABLETS BY MOUTH EVERY 8 HOURS AS NEEDED FOR ARTHRITIS PAIN  Qty: 100 tablet, Refills: 5      carvedilol (COREG) 25 MG tablet Take 1 tablet by mouth 2 times daily (with meals).  Qty: 180 tablet, Refills: 2      triamcinolone (ARISTOCORT) 0.1 % cream Apply 1 application topically 2 times daily. AS NEEDED TO  RASH AREAS  Qty: 30 g, Refills: 0      hydroxychloroquine 400 MG Tab Take 400 mg by mouth daily.  Qty: 90 tablet, Refills: 1      allopurinol (ZYLOPRIM) 100 MG tablet Take 1 tablet by mouth daily.  Qty: 30 tablet, Refills: 3      famotidine (PEPCID) 20 MG tablet TAKE 1 TABLET BY MOUTH NIGHTLY AS NEEDED (HEARTBURN).  Qty: 90 tablet, Refills: 3      atorvastatin (LIPITOR) 40 MG tablet TAKE 1 TABLET BY MOUTH DAILY AT BEDTIME FOR CHOLESTEROL (AVOID GRAPEFRUIT JUICE)  Qty: 90 tablet, Refills: 3      furosemide (LASIX) 40 MG tablet TAKE 1 TABLET BY MOUTH DAILY AS NEEDED (LEG SWELLING).  Qty: 90 tablet, Refills: 3      Vitamin D, Ergocalciferol, 1.25 mg (50,000 units) capsule TAKE 1 CAPSULE BY MOUTH 1 DAY A WEEK.  Qty: 4 capsule, Refills: 5      Homeopathic Products (ZICAM ALLERGY RELIEF NA) Spray 1 spray in each nostril 2 times daily as needed.      Xarelto 20 MG Tab TAKE 1 TABLET BY MOUTH DAILY.  Qty: 90 tablet, Refills: 2      metoCLOPramide (REGLAN) 5 MG tablet TAKE 1 TABLET BY MOUTH 3 TIMES DAILY. AS NEEDED FOR NAUSEA.  Qty: 90 tablet, Refills: 5      ondansetron (ZOFRAN) 4 MG tablet Take 4 mg by mouth every 4 hours as needed.      Sennosides 25 MG Tab Take 25 mg by mouth daily.      indomethacin (INDOCIN) 25 MG capsule TAKE 1 CAPSULE BY MOUTH 2 TIMES DAILY (WITH MEALS). AS NEEDED FOR ACUTE GOUT FLARE UP  Qty: 180 capsule, Refills: 2         New Prescriptions    Details   predniSONE (DELTASONE) 20 MG tablet Take 2 tablets by mouth daily.  Qty: 10 tablet, Refills: 0             Past Medical History:   Diagnosis Date   • AF (atrial fibrillation) (CMS/HCC)    • GORDO (acute kidney injury) (CMS/HCC)    • Atrial fibrillation (CMS/HCC)    • Chronic pain    • Elevated PSA    • Hyperlipidemia    • Hyperparathyroidism (CMS/HCC)    • Hypertensive heart disease with heart failure (CMS/HCC)    • Hyperuricemia    • Morbid obesity with BMI of 40.0-44.9, adult (CMS/HCC)    • Nausea vomiting and diarrhea    • Prediabetes    •  Seropositive rheumatoid arthritis (CMS/HCC)    • Stage 3a chronic kidney disease (CMS/HCC)    • Vitamin D deficiency        Past Surgical History:   Procedure Laterality Date   • KNEE SCOPE,DIAGNOSTIC         Family History   Problem Relation Age of Onset   • Patient is unaware of any medical problems Mother    • Patient is unaware of any medical problems Father        Social History     Tobacco Use   • Smoking status: Never Smoker   • Smokeless tobacco: Never Used   Vaping Use   • Vaping Use: never used   Substance Use Topics   • Alcohol use: Yes     Comment: pt states drank a 6 pack of beer within last 2 weeks.    • Drug use: Yes     Frequency: 3.0 times per week     Types: Marijuana       Review of Systems   Constitutional: Negative for chills, fatigue and fever.   HENT: Negative for facial swelling and sore throat.    Eyes: Negative for photophobia and pain.   Respiratory: Negative for cough and shortness of breath.    Cardiovascular: Negative for chest pain and leg swelling.   Gastrointestinal: Negative for abdominal pain, diarrhea, nausea and vomiting.   Genitourinary: Negative for dysuria and hematuria.   Musculoskeletal: Negative for neck stiffness.        Hand/arm pain/swelling   Skin: Negative for rash.   Neurological: Negative for dizziness and headaches.   All other systems reviewed and are negative.      Physical Exam     ED Triage Vitals [06/07/22 0648]   ED Triage Vitals Group      Temp 97.9 °F (36.6 °C)      Heart Rate (!) 104      Resp 18      BP (!) 188/119      SpO2 97 %      EtCO2 mmHg       Height       Weight       Weight Scale Used       BMI (Calculated)       IBW/kg (Calculated)        Physical Exam  Vitals and nursing note reviewed.   Constitutional:       General: He is not in acute distress.  HENT:      Head: Normocephalic and atraumatic.      Nose: Nose normal.      Mouth/Throat:      Mouth: Mucous membranes are moist.      Neck: Neck supple. No rigidity.   Eyes:      Extraocular  Movements: Extraocular movements intact.      Conjunctiva/sclera: Conjunctivae normal.      Pupils: Pupils are equal, round, and reactive to light.   Cardiovascular:      Rate and Rhythm: Tachycardia present. Rhythm irregular.      Pulses: Normal pulses.           Radial pulses are 2+ on the right side and 2+ on the left side.   Pulmonary:      Effort: Pulmonary effort is normal.      Breath sounds: Normal breath sounds.   Abdominal:      General: There is no distension.      Palpations: Abdomen is soft.      Tenderness: There is no abdominal tenderness.   Musculoskeletal:      Comments: Left hand swelling, mild warmth, no erythema, diffuse left hand and wrist tenderness to light touch and pain with minimal movement, able to fully flex and extend fingers, no swelling of arm   Skin:     General: Skin is warm and dry.      Capillary Refill: Capillary refill takes less than 2 seconds.   Neurological:      General: No focal deficit present.      Mental Status: He is alert and oriented to person, place, and time. Mental status is at baseline.      Sensory: No sensory deficit.   Psychiatric:         Mood and Affect: Mood normal.         ED Course     Procedures    Lab Results     Results for orders placed or performed during the hospital encounter of 06/07/22   Comprehensive Metabolic Panel   Result Value Ref Range    Fasting Status      Sodium 139 135 - 145 mmol/L    Potassium 4.6 3.4 - 5.1 mmol/L    Chloride 106 97 - 110 mmol/L    Carbon Dioxide 22 21 - 32 mmol/L    Anion Gap 16 7 - 19 mmol/L    Glucose 100 (H) 70 - 99 mg/dL    BUN 12 6 - 20 mg/dL    Creatinine 1.39 (H) 0.67 - 1.17 mg/dL    Glomerular Filtration Rate 54 (L) >=60    BUN/ Creatinine Ratio 9 7 - 25    Calcium 9.4 8.4 - 10.2 mg/dL    Bilirubin, Total 0.6 0.2 - 1.0 mg/dL    GOT/AST 22 <=37 Units/L    GPT/ALT 8 <64 Units/L    Alkaline Phosphatase 74 45 - 117 Units/L    Albumin 3.3 (L) 3.6 - 5.1 g/dL    Protein, Total 7.7 6.4 - 8.2 g/dL    Globulin 4.4 (H) 2.0  - 4.0 g/dL    A/G Ratio 0.8 (L) 1.0 - 2.4   TROPONIN I, HIGH SENSITIVITY   Result Value Ref Range    Troponin I, High Sensitivity 16 <77 ng/L   CBC with Automated Differential (performable only)   Result Value Ref Range    WBC 11.8 (H) 4.2 - 11.0 K/mcL    RBC 4.59 4.50 - 5.90 mil/mcL    HGB 14.9 13.0 - 17.0 g/dL    HCT 44.4 39.0 - 51.0 %    MCV 96.7 78.0 - 100.0 fl    MCH 32.5 26.0 - 34.0 pg    MCHC 33.6 32.0 - 36.5 g/dL    RDW-CV 13.2 11.0 - 15.0 %    RDW-SD 46.9 39.0 - 50.0 fL     140 - 450 K/mcL    NRBC 0 <=0 /100 WBC    Neutrophil, Percent 68 %    Lymphocytes, Percent 18 %    Mono, Percent 12 %    Eosinophils, Percent 0 %    Basophils, Percent 0 %    Immature Granulocytes 2 %    Absolute Neutrophils 8.0 (H) 1.8 - 7.7 K/mcL    Absolute Lymphocytes 2.1 1.0 - 4.0 K/mcL    Absolute Monocytes 1.4 (H) 0.3 - 0.9 K/mcL    Absolute Eosinophils  0.0 0.0 - 0.5 K/mcL    Absolute Basophils 0.0 0.0 - 0.3 K/mcL    Absolute Immmature Granulocytes 0.2 0.0 - 0.2 K/mcL   Uric Acid   Result Value Ref Range    Uric Acid 7.2 3.5 - 7.2 mg/dL       EKG Results     EKG Interpretation  Rate: 118 bpm  Rhythm: atrial fibrillation   Abnormality: yes, nonspecific T wave changes.  Normal axis, normal QRS and QTc.  No significant change from previous on 5/14/22.      EKG tracing interpreted by ED physician    Radiology Results     Imaging Results          US VASC UPPER EXTREMITY VENOUS DUPLEX LEFT (Final result)  Result time 06/07/22 10:45:47   Procedure changed from US VASC EXTREMITY UPPER VENOUS DUPLEX     Final result                 Impression:      No evidence of deep vein thrombosis of the left upper extremity.      Electronically Signed by: FRANK KEEN MD   Signed on: 6/7/2022 10:45 AM                Narrative:    EXAM: US VASC UPPER EXTREMITY VENOUS DUPLEX LEFT    CLINICAL INDICATION: SWELLING    COMPARISON:  None available.    TECHNIQUE: A Duplex Doppler study was performed of the venous system  consisting of integrated two  dimensional, real time imaging, color flow  doppler and spectral analysis probes. The distal left internal jugular  vein, subclavian vein, axillary vein, brachial vein, basilic vein, radial  vein, ulnar vein and the cephalic vein interrogated.      FINDINGS: The visualized venous segments show compressibility with  augmentable venous waveforms.                               XR HAND MIN 3 VIEWS LEFT (Final result)  Result time 06/07/22 09:22:14    Final result                 Impression:      1.  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  2.  No evidence of acute fracture.  Technical limitation is present as  discussed above.    Electronically Signed by: BLAKE FLETCHER   Signed on: 6/7/2022 9:22 AM                Narrative:    XR HAND MIN 3 VIEWS LEFT : 6/7/2022 9:10 AM    HISTORY: Left hand pain.  NO KNOWN INJURY, PT STATES THAT HAND IS SWOLLEN AND UNABLE TO MOVE FINGERS  DUE TO EXTREME PAIN    COMPARISON: 11/04/2021.    FINDINGS: Three views of left hand were obtained.  There is significant  technical limitation because all the fingers are flexed.    There is no evidence of acute fracture, subluxation, radiopaque foreign  body or joint effusion.  Mild narrowing of the 2nd metacarpal phalangeal  joint is present without spurring.  Similar narrowing is present in the  interphalangeal joint of the thumb.  DIP joints of the other digits cannot  be reliably assessed.  The included carpus is unremarkable.                                 ED Medication Orders (From admission, onward)    Ordered Start     Status Ordering Provider    06/07/22 0955 06/07/22 0956  morphine injection 4 mg  ONCE         Last MAR action: Given WINDY OSORIO    06/07/22 0924 06/07/22 0925  carvedilol (COREG) tablet 25 mg  ONCE         Last MAR action: Given WINDY OSORIO    06/07/22 0848 06/07/22 0849  ketorolac injection 15 mg  ONCE         Last MAR action: Given DEVON  WINDY DONNELLY  71 year-old male with past medical hx of HTN, a-fib, HLD, hyperparathyroidism and RA presents to ED with cc of left upper extremity pain.  On exam, patient is well-appearing, no acute distress.  He is hypertensive and borderline tachycardic, afebrile, satting well on room air.  He has obvious edema with mild warmth to the left hand, although no edema of the arm.  He has exquisite tenderness to light touch throughout the left hand and pain with minimal range of motion of the hand and wrist.  There is no redness.  Intact radial pulses, intact strength and sensation.  Initial screening labs are consistent with his baseline, creatinine 1.39 which is unchanged, slight leukocytosis of 11.8.  Will check a uric acid level.  Patient denies any trauma to the extremity, but will check an x-ray.  Will also check an ultrasound to rule out DVT.  Suspect that this is most likely gout versus rheumatoid arthritis.  Do not suspect septic joint at this time.  Will treat with a dose of Toradol for pain and will reassess.    9:30 AM  Patient is hypertensive and tachycardic, did not take his coreg today. Will give his home dose. Uric acid level is normal. Hand xray shows arthritis but no acute abnormalities. Awaiting ultrasound.    11:34 AM  Vitals have improved after patient received his Coreg.  Ultrasound shows no evidence of DVT.  Patient says that he is feeling much better after the pain medications.  Is stable for discharge home.  Will prescribe a short course of increased steroids for treatment of what is likely this arthritis flareup.  Advised him to follow-up with his rheumatologist.  Return precautions given.  Patient expressed understanding of and agreement with plan.    Clinical Impression     ED Diagnosis   1. Arthritis     2. Intermittent pain and swelling of hand         Disposition        Discharge 6/7/2022 11:25 AM  Ezio Guillen discharge to home/self care.            Charting performed by ED  Ramón Jc.       Shahnaz Jc MD  06/07/22 9299     Assistance was available

## 2024-09-06 NOTE — ED PROVIDER NOTE - NSFOLLOWUPINSTRUCTIONS_ED_ALL_ED_FT
Make sure to keep wound covered and dry for 48 hours, after you can wash with soap and water, apply neosporin twice daily after washing.    Return to ED in 7 days for staple removal.    Laceration    A laceration is a cut that goes through all of the layers of the skin and into the tissue that is right under the skin. Some lacerations heal on their own. Others need to be closed with skin adhesive strips, skin glue, stitches (sutures), or staples. Proper laceration care minimizes the risk of infection and helps the laceration to heal better.  If non-absorbable stitches or staples have been placed, they must be taken out within the time frame instructed by your healthcare provider.    SEEK IMMEDIATE MEDICAL CARE IF YOU HAVE ANY OF THE FOLLOWING SYMPTOMS: swelling around the wound, worsening pain, drainage from the wound, red streaking going away from your wound, inability to move finger or toe near the laceration, or discoloration of skin near the laceration.    Closed Head Injury    A closed head injury is an injury to your head that may or may not involve a traumatic brain injury (TBI). Symptoms of TBI can be short or long lasting and include headache, dizziness, interference with memory or speech, fatigue, confusion, changes in sleep, mood changes, nausea, depression/anxiety, and dulling of senses. Make sure to obtain proper rest which includes getting plenty of sleep, avoiding excessive visual stimulation, and avoiding activities that may cause physical or mental stress. Avoid any situation where there is potential for another head injury, including sports.    SEEK IMMEDIATE MEDICAL CARE IF YOU HAVE ANY OF THE FOLLOWING SYMPTOMS: unusual drowsiness, vomiting, severe dizziness, seizures, lightheadedness, muscular weakness, different pupil sizes, visual changes, or clear or bloody discharge from your ears or nose.

## 2024-09-13 ENCOUNTER — EMERGENCY (EMERGENCY)
Facility: HOSPITAL | Age: 84
LOS: 1 days | Discharge: HOME | End: 2024-09-13
Admitting: EMERGENCY MEDICINE

## 2024-09-13 VITALS
DIASTOLIC BLOOD PRESSURE: 74 MMHG | WEIGHT: 151.02 LBS | OXYGEN SATURATION: 94 % | RESPIRATION RATE: 17 BRPM | SYSTOLIC BLOOD PRESSURE: 152 MMHG | HEART RATE: 93 BPM | HEIGHT: 68 IN | TEMPERATURE: 98 F

## 2024-09-13 DIAGNOSIS — Z98.49 CATARACT EXTRACTION STATUS, UNSPECIFIED EYE: Chronic | ICD-10-CM

## 2024-09-13 DIAGNOSIS — Z94.7 CORNEAL TRANSPLANT STATUS: Chronic | ICD-10-CM

## 2024-09-13 PROCEDURE — L9995: CPT

## 2024-09-13 PROCEDURE — 99212 OFFICE O/P EST SF 10 MIN: CPT

## 2024-09-13 NOTE — ED ADULT NURSE NOTE - NSFALLHARMRISKINTERV_ED_ALL_ED

## 2024-09-13 NOTE — ED PROVIDER NOTE - NSFOLLOWUPINSTRUCTIONS_ED_ALL_ED_FT
Please follow-up with your primary care physician.    Return to emergency department for any new or worsening symptoms, any concerns.  ==========  Wound Closure Removal, Care After  The following information offers guidance on how to care for yourself after your stitches (sutures), staples, or adhesive strips have been removed. Your health care provider may also give you more specific instructions. If you have problems or questions, contact your health care provider.    What can I expect after the procedure?  After your sutures or staples have been removed or your adhesive strips have fallen off, it is common to have:  Some discomfort and swelling in the area.  Slight redness in the area.  Follow these instructions at home:  If you have a dressing:    Wash your hands with soap and water for at least 20 seconds before and after you change your bandage (dressing). If soap and water are not available, use hand .  Change your dressing as told by your health care provider. If your dressing becomes wet or dirty, or develops a bad smell, change it as soon as possible.  If your dressing sticks to your skin, pour warm, clean water over it until it loosens and can be removed without pulling apart the wound edges. Pat the area dry with a soft, clean towel. Do not rub the wound because that may cause bleeding.  Wound care    Washing hands with soap and water.  Check your wound every day for signs of infection. Check for:  More redness, swelling, or pain.  Fluid or blood.  New warmth, a rash, or hardness at the wound site.  Pus or a bad smell.  Wash your hands with soap and water for at least 20 seconds before and after touching your wound. If soap and water are not available, use hand .  Keep the wound area dry and clean. Clean and pat the wound dry as told by your health care provider.  Apply cream or ointment only as told by your health care provider.  If skin glue or adhesive strips were applied after sutures or staples were removed, leave these closures in place until they peel off on their own. If adhesive strip edges start to loosen and curl up, you may trim the loose edges. Do not remove adhesive strips completely unless your health care provider tells you to do that.  Continue to protect the wound from injury.  Do not pick at your wound. Picking can cause an infection.  Bathing    Do not take baths, swim, or use a hot tub until your health care provider approves. Ask your health care provider if you may take showers.  Follow these steps for showering:  If you have a dressing, remove it before getting into the shower.  In the shower, allow soapy water to get on the wound. Avoid scrubbing the wound.  When you get out of the shower, dry the wound by patting it with a clean towel.  Reapply a dressing over the wound, if needed.  Scar care    When your wound has completely healed, help decrease the size of your scar by:  Wearing sunscreen over the scar or covering it with clothing when you are outside. New scars get sunburned easily, which can make scarring worse.  Gently massaging the scarred area. This can decrease scar thickness.  General instructions    Take over-the-counter and prescription medicines only as told by your health care provider.  Keep all follow-up visits. This is important.  Contact a health care provider if:  You have more redness, swelling, or pain around your wound.  You have fluid or blood coming from your wound.  You have new warmth, a rash, or hardness at the wound site.  You have pus or a bad smell coming from your wound.  Your wound opens up.  Get help right away if:  You have a fever or chills.  You have red streaks coming from your wound.  Summary  Change your dressing as told by your health care provider. If your dressing becomes wet or dirty, or develops a bad smell, change it as soon as possible.  Check your wound every day for signs of infection.  Wash your hands with soap and water for 20 seconds before and after touching your wound.  This information is not intended to replace advice given to you by your health care provider. Make sure you discuss any questions you have with your health care provider.

## 2024-09-13 NOTE — ED PROVIDER NOTE - PATIENT PORTAL LINK FT
You can access the FollowMyHealth Patient Portal offered by [facility] by registering at the following website: [email or web address]. By joining [facility]’s FollowMyHealth portal, you will also be able to view your health information using other applications (apps) compatible with our system. Normal vision: sees adequately in most situations; can see medication labels, newsprint

## 2024-09-13 NOTE — ED PROVIDER NOTE - OBJECTIVE STATEMENT
Scalp lac repair 1 week ago with 7 staples.  Wound healing well without bleeding, purulence, pain, swelling.  No headache or acute dysequilibrium, no vision changes, no focal weakness/paresthesia.  No fever or other acute complaints.

## 2024-09-13 NOTE — ED PROVIDER NOTE - CLINICAL SUMMARY MEDICAL DECISION MAKING FREE TEXT BOX
Scalp repair 1 week ago with 7 staples.  Wound healing well.  No s/s of localized or systemic infection.  No acute neurologic s/s to suggest development of ICH.  Staples removed.  Stable for follow-up with primary care provider, advised patient of indications for ED return for any new or worsening symptoms.

## 2024-09-13 NOTE — ED PROVIDER NOTE - PHYSICAL EXAMINATION
CONSTITUTIONAL: NAD   SKIN: Normal color and turgor.    HEAD: Scalp lac with 7 staples in situ. No swelling, erythema, tenderness, or drainage.  EYES: Conjunctiva clear. Anicteric sclera.  ENT: Airway clear. Normal voice. MMM.  RESPIRATORY:  Normal respiratory rate and effort.  CARDIOVASCULAR:  RRR   GI:  Abdomen soft, nontender.    MSK: Neck supple.  No LE edema or calf tenderness. No joint swelling or ROM limitation.  NEURO: Alert, clear mental status.  Speech clear. No focal deficits. Gait steady.

## 2024-09-23 NOTE — ED PROVIDER NOTE - OBJECTIVE STATEMENT
Physical Therapy Visit    Visit Type: Daily Treatment Note  Visit: 11  Referring Provider: Barber Carter PA-C  Medical Diagnosis (from order): M17.11 - Primary osteoarthritis of right knee   Patient alert and oriented X3.    SUBJECTIVE                                                                                                               Patient reports that she was continues to be busy with packing up her garage. States that she was not as active over Sunday so her knee feels better today. Has not worked as hard on her home exercise program.   Functional Change: Continues to walk with walker for longer distances.     Pain / Symptoms  - Pain rating (out of 10): Current: 3       OBJECTIVE                                                                                                                    Observation   Impairments in left hip stability ongoing in a standing position with contralateral hip drop                     Treatment     Therapeutic Exercise  Ambulated 300 ft today to and from waiting room and around gym during session with verbal cue to keep head up     NuStep - 6 minutes - level 7 - for range of motion and soft tissue warm up     Standing:    - bilateral heel raises - 2x15   - Squat - x12    Sit to stand from highest stool - x10    Alternating knee flexion - 2x10 - 2# ankle weights  Hip abduction vs 2# ankle weight on the left - x10 .    Walking with 2# ankle weights - 2x30 ft     Seated long arc quad vs 2# ankle weight - 2x10 bilaterally    Supine:    - SAQ with verbal cue to sustain extension and then go into straight leg raise - x10 bilaterally   - straight leg raise x5 bilaterally   - verbally discussed supine hip abduction with verbal cue to attempt in bed with heel slides to improve home exercise program success.     Ambulated x250 ft at end of session out of clinic to waiting room.     Manual Therapy   Tibiofemoral glides:   - anterior to posterior glide of femur on stabilized  tibia, grade IV    Skilled input: verbal instruction/cues, tactile instruction/cues and as detailed above    Writer verbally educated and received verbal consent for hand placement, positioning of patient, and techniques to be performed today from patient for clothing adjustments for techniques, hand placement and palpation for techniques and therapist position for techniques as described above and how they are pertinent to the patient's plan of care.  Home Exercise Program  Access Code: [email or web address]  URL: [email or web address]  Date: 09/05/2024  Prepared by: Rachel Sipple    Exercises  - Supine Heel Slide with Strap  - 3 x daily - 7 x weekly - 2 sets - 10 reps  - Long Sitting Quad Set with Towel Roll Under Heel  - 3 x daily - 7 x weekly - 2 sets - 10 reps - 5 hold  - Sit to Stand with Armchair  - 1 x daily - 7 x weekly - 2 sets - 10 reps  - Seated Knee Flexion Extension AROM   - 1 x daily - 7 x weekly - 2 sets - 10 reps  - Heel Raises with Counter Support  - 1 x daily - 7 x weekly - 2 sets - 10 reps  - Seated Hamstring Stretch  - 2-3 x daily - 7 x weekly - 2 sets - 30 hold  - Seated Long Arc Quad  - 1 x daily - 7 x weekly - 2-3 sets - 10 reps  - Bilateral Short Arc Quad Set  - 1 x daily - 7 x weekly - 2-3 sets - 10 reps  - Quadricep Stretch with Chair and Counter Support  - 2 x daily - 7 x weekly - 2 reps - 15-30 hold  - Seated Hip Abduction with Resistance  - 1 x daily - 7 x weekly - 3 sets - 10 reps      ASSESSMENT                                                                                                            Patient continues to present with poor adherence to home exercise program but good generalized mobility completion at home. She is limited in bilateral lower extremity strength in all weight bearing positions but is slowly progressing in open chain in weighted resistance. She demonstrated appropriate fatigue at the end of today's session without increase in symptoms. She will  continue to benefit from physical therapy for completion of home exercise program and progression in strength to allow for improved independence and normalizing gait pattern.   Education:   - Results of above outlined education: Verbalizes understanding    PLAN                                                                                                                           Suggestions for next session as indicated: Progress per plan of care.    Continue to modify home exercise program as needed. Overall hip strength.        Therapy procedure time and total treatment time can be found documented on the Time Entry flowsheet     79 y/o m complains of fall after experiencing brief episode of vertigo.  Pt states similar episodes have happened this past.  Denies LOC, nausea, vomiting.  Pt not on anticoagulation.  Denies other injuries.

## 2024-10-06 NOTE — ED ADULT NURSE NOTE - ED CARDIAC CAPILLARY REFILL
PAST SURGICAL HISTORY:  S/P hysterectomy     S/P partial lobectomy of lung right lung for stage 1 lung ca, on 11/22/22     2 seconds or less

## 2024-11-02 NOTE — H&P ADULT - ASSESSMENT
83 year old male with PMHx Parkinson's disease, vertigo, GERD, LBBB presents from home after having a fall due to weakness and found to be positive for COVID-19 and admitted for further management. Cardiac Monitor/Defib/ACLS/Rescue Kit/O2/BVM

## 2024-12-02 NOTE — ED ADULT TRIAGE NOTE - ESI TRIAGE ACUITY LEVEL, MLM
Infliximab Pregnancy And Lactation Text: This medication is Pregnancy Category B and is considered safe during pregnancy. It is unknown if this medication is excreted in breast milk. Klisyri Counseling:  I discussed with the patient the risks of Klisyri including but not limited to erythema, scaling, itching, weeping, crusting, and pain. Cibinqo Counseling: I discussed with the patient the risks of Cibinqo therapy including but not limited to common cold, nausea, headache, cold sores, increased blood CPK levels, dizziness, UTIs, fatigue, acne, and vomitting. Live vaccines should be avoided.  This medication has been linked to serious infections; higher rate of mortality; malignancy and lymphoproliferative disorders; major adverse cardiovascular events; thrombosis; thrombocytopenia and lymphopenia; lipid elevations; and retinal detachment. Aklief Pregnancy And Lactation Text: It is unknown if this medication is safe to use during pregnancy.  It is unknown if this medication is excreted in breast milk.  Breastfeeding women should use the topical cream on the smallest area of the skin for the shortest time needed while breastfeeding.  Do not apply to nipple and areola. Topical Sulfur Applications Pregnancy And Lactation Text: This medication is Pregnancy Category C and has an unknown safety profile during pregnancy. It is unknown if this topical medication is excreted in breast milk. Metronidazole Counseling:  I discussed with the patient the risks of metronidazole including but not limited to seizures, nausea/vomiting, a metallic taste in the mouth, nausea/vomiting and severe allergy. Cimzia Pregnancy And Lactation Text: This medication crosses the placenta but can be considered safe in certain situations. Cimzia may be excreted in breast milk. Klisyri Pregnancy And Lactation Text: It is unknown if this medication can harm a developing fetus or if it is excreted in breast milk. Clofazimine Pregnancy And Lactation Text: This medication is Pregnancy Category C and isn't considered safe during pregnancy. It is excreted in breast milk. SSKI Counseling:  I discussed with the patient the risks of SSKI including but not limited to thyroid abnormalities, metallic taste, GI upset, fever, headache, acne, arthralgias, paraesthesias, lymphadenopathy, easy bleeding, arrhythmias, and allergic reaction. Cyclophosphamide Pregnancy And Lactation Text: This medication is Pregnancy Category D and it isn't considered safe during pregnancy. This medication is excreted in breast milk. Eucrisa Pregnancy And Lactation Text: This medication has not been assigned a Pregnancy Risk Category but animal studies failed to show danger with the topical medication. It is unknown if the medication is excreted in breast milk. Solaraze Pregnancy And Lactation Text: This medication is Pregnancy Category B and is considered safe. There is some data to suggest avoiding during the third trimester. It is unknown if this medication is excreted in breast milk. Cibinqo Pregnancy And Lactation Text: It is unknown if this medication will adversely affect pregnancy or breast feeding.  You should not take this medication if you are currently pregnant or planning a pregnancy or while breastfeeding. Azelaic Acid Counseling: Patient counseled that medicine may cause skin irritation and to avoid applying near the eyes.  In the event of skin irritation, the patient was advised to reduce the amount of the drug applied or use it less frequently.   The patient verbalized understanding of the proper use and possible adverse effects of azelaic acid.  All of the patient's questions and concerns were addressed. Simponi Counseling:  I discussed with the patient the risks of golimumab including but not limited to myelosuppression, immunosuppression, autoimmune hepatitis, demyelinating diseases, lymphoma, and serious infections.  The patient understands that monitoring is required including a PPD at baseline and must alert us or the primary physician if symptoms of infection or other concerning signs are noted. Wartpeel Counseling:  I discussed with the patient the risks of Wartpeel including but not limited to erythema, scaling, itching, weeping, crusting, and pain. 3 Metronidazole Pregnancy And Lactation Text: This medication is Pregnancy Category B and considered safe during pregnancy.  It is also excreted in breast milk. Cosentyx Counseling:  I discussed with the patient the risks of Cosentyx including but not limited to worsening of Crohn's disease, immunosuppression, allergic reactions and infections.  The patient understands that monitoring is required including a PPD at baseline and must alert us or the primary physician if symptoms of infection or other concerning signs are noted. Cantharidin Pregnancy And Lactation Text: This medication has not been proven safe during pregnancy. It is unknown if this medication is excreted in breast milk. Adbry Counseling: I discussed with the patient the risks of tralokinumab including but not limited to eye infection and irritation, cold sores, injection site reactions, worsening of asthma, allergic reactions and increased risk of parasitic infection.  Live vaccines should be avoided while taking tralokinumab. The patient understands that monitoring is required and they must alert us or the primary physician if symptoms of infection or other concerning signs are noted. Colchicine Counseling:  Patient counseled regarding adverse effects including but not limited to stomach upset (nausea, vomiting, stomach pain, or diarrhea).  Patient instructed to limit alcohol consumption while taking this medication.  Colchicine may reduce blood counts especially with prolonged use.  The patient understands that monitoring of kidney function and blood counts may be required, especially at baseline. The patient verbalized understanding of the proper use and possible adverse effects of colchicine.  All of the patient's questions and concerns were addressed. Soolantra Counseling: I discussed with the patients the risks of topial Soolantra. This is a medicine which decreases the number of mites and inflammation in the skin. You experience burning, stinging, eye irritation or allergic reactions.  Please call our office if you develop any problems from using this medication. Minoxidil Counseling: Minoxidil is a topical medication which can increase blood flow where it is applied. It is uncertain how this medication increases hair growth. Side effects are uncommon and include stinging and allergic reactions. Cyclosporine Counseling:  I discussed with the patient the risks of cyclosporine including but not limited to hypertension, gingival hyperplasia,myelosuppression, immunosuppression, liver damage, kidney damage, neurotoxicity, lymphoma, and serious infections. The patient understands that monitoring is required including baseline blood pressure, CBC, CMP, lipid panel and uric acid, and then 1-2 times monthly CMP and blood pressure. Sski Pregnancy And Lactation Text: This medication is Pregnancy Category D and isn't considered safe during pregnancy. It is excreted in breast milk. Hydroquinone Counseling:  Patient advised that medication may result in skin irritation, lightening (hypopigmentation), dryness, and burning.  In the event of skin irritation, the patient was advised to reduce the amount of the drug applied or use it less frequently.  Rarely, spots that are treated with hydroquinone can become darker (pseudoochronosis).  Should this occur, patient instructed to stop medication and call the office. The patient verbalized understanding of the proper use and possible adverse effects of hydroquinone.  All of the patient's questions and concerns were addressed. Azelaic Acid Pregnancy And Lactation Text: This medication is considered safe during pregnancy and breast feeding. Simponi Pregnancy And Lactation Text: The risk during pregnancy and breastfeeding is uncertain with this medication. Litfulo Counseling: I discussed with the patient the risks of Litfulo therapy including but not limited to upper respiratory tract infections, shingles, cold sores, and nausea. Live vaccines should be avoided.  This medication has been linked to serious infections; higher rate of mortality; malignancy and lymphoproliferative disorders; major adverse cardiovascular events; thrombosis; gastrointestinal perforations; neutropenia; lymphopenia; anemia; liver enzyme elevations; and lipid elevations. Wartpeel Pregnancy And Lactation Text: This medication is Pregnancy Category X and contraindicated in pregnancy and in women who may become pregnant. It is unknown if this medication is excreted in breast milk. Adbry Pregnancy And Lactation Text: It is unknown if this medication will adversely affect pregnancy or breast feeding. Minocycline Counseling: Patient advised regarding possible photosensitivity and discoloration of the teeth, skin, lips, tongue and gums.  Patient instructed to avoid sunlight, if possible.  When exposed to sunlight, patients should wear protective clothing, sunglasses, and sunscreen.  The patient was instructed to call the office immediately if the following severe adverse effects occur:  hearing changes, easy bruising/bleeding, severe headache, or vision changes.  The patient verbalized understanding of the proper use and possible adverse effects of minocycline.  All of the patient's questions and concerns were addressed. Soolantra Pregnancy And Lactation Text: This medication is Pregnancy Category C. This medication is considered safe during breast feeding. Cyclosporine Pregnancy And Lactation Text: This medication is Pregnancy Category C and it isn't know if it is safe during pregnancy. This medication is excreted in breast milk. Thalidomide Counseling: I discussed with the patient the risks of thalidomide including but not limited to birth defects, anxiety, weakness, chest pain, dizziness, cough and severe allergy. Litfulo Pregnancy And Lactation Text: Based on animal studies, Lifulo may cause embryo-fetal harm when administered to pregnant women.  The medication should not be used in pregnancy.  Breastfeeding is not recommended during treatment. Skyrizi Counseling: I discussed with the patient the risks of risankizumab-rzaa including but not limited to immunosuppression, and serious infections.  The patient understands that monitoring is required including a PPD at baseline and must alert us or the primary physician if symptoms of infection or other concerning signs are noted. Benzoyl Peroxide Counseling: Patient counseled that medicine may cause skin irritation and bleach clothing.  In the event of skin irritation, the patient was advised to reduce the amount of the drug applied or use it less frequently.   The patient verbalized understanding of the proper use and possible adverse effects of benzoyl peroxide.  All of the patient's questions and concerns were addressed. Winlevi Counseling:  I discussed with the patient the risks of topical clascoterone including but not limited to erythema, scaling, itching, and stinging. Patient voiced their understanding. Opioid Counseling: I discussed with the patient the potential side effects of opioids including but not limited to addiction, altered mental status, and depression. I stressed avoiding alcohol, benzodiazepines, muscle relaxants and sleep aids unless specifically okayed by a physician. The patient verbalized understanding of the proper use and possible adverse effects of opioids. All of the patient's questions and concerns were addressed. They were instructed to flush the remaining pills down the toilet if they did not need them for pain. Dutasteride Male Counseling: Dustasteride Counseling:  I discussed with the patient the risks of use of dutasteride including but not limited to decreased libido, decreased ejaculate volume, and gynecomastia. Women who can become pregnant should not handle medication.  All of the patient's questions and concerns were addressed. Minocycline Pregnancy And Lactation Text: This medication is Pregnancy Category D and not consider safe during pregnancy. It is also excreted in breast milk. Dupixent Counseling: I discussed with the patient the risks of dupilumab including but not limited to eye infection and irritation, cold sores, injection site reactions, worsening of asthma, allergic reactions and increased risk of parasitic infection.  Live vaccines should be avoided while taking dupilumab. Dupilumab will also interact with certain medications such as warfarin and cyclosporine. The patient understands that monitoring is required and they must alert us or the primary physician if symptoms of infection or other concerning signs are noted. Dapsone Counseling: I discussed with the patient the risks of dapsone including but not limited to hemolytic anemia, agranulocytosis, rashes, methemoglobinemia, kidney failure, peripheral neuropathy, headaches, GI upset, and liver toxicity.  Patients who start dapsone require monitoring including baseline LFTs and weekly CBCs for the first month, then every month thereafter.  The patient verbalized understanding of the proper use and possible adverse effects of dapsone.  All of the patient's questions and concerns were addressed. Thalidomide Pregnancy And Lactation Text: This medication is Pregnancy Category X and is absolutely contraindicated during pregnancy. It is unknown if it is excreted in breast milk. Methotrexate Counseling:  Patient counseled regarding adverse effects of methotrexate including but not limited to nausea, vomiting, abnormalities in liver function tests. Patients may develop mouth sores, rash, diarrhea, and abnormalities in blood counts. The patient understands that monitoring is required including LFT's and blood counts.  There is a rare possibility of scarring of the liver and lung problems that can occur when taking methotrexate. Persistent nausea, loss of appetite, pale stools, dark urine, cough, and shortness of breath should be reported immediately. Patient advised to discontinue methotrexate treatment at least three months before attempting to become pregnant.  I discussed the need for folate supplements while taking methotrexate.  These supplements can decrease side effects during methotrexate treatment. The patient verbalized understanding of the proper use and possible adverse effects of methotrexate.  All of the patient's questions and concerns were addressed. Imiquimod Counseling:  I discussed with the patient the risks of imiquimod including but not limited to erythema, scaling, itching, weeping, crusting, and pain.  Patient understands that the inflammatory response to imiquimod is variable from person to person and was educated regarded proper titration schedule.  If flu-like symptoms develop, patient knows to discontinue the medication and contact us. Mirvaso Counseling: Mirvaso is a topical medication which can decrease superficial blood flow where applied. Side effects are uncommon and include stinging, redness and allergic reactions. Topical Retinoid counseling:  Patient advised to apply a pea-sized amount only at bedtime and wait 30 minutes after washing their face before applying.  If too drying, patient may add a non-comedogenic moisturizer. The patient verbalized understanding of the proper use and possible adverse effects of retinoids.  All of the patient's questions and concerns were addressed. Drysol Counseling:  I discussed with the patient the risks of drysol/aluminum chloride including but not limited to skin rash, itching, irritation, burning. Spironolactone Pregnancy And Lactation Text: This medication can cause feminization of the male fetus and should be avoided during pregnancy. The active metabolite is also found in breast milk. Zepbound Counseling: I reviewed the possible side effects including: thyroid tumors, kidney disease, gallbladder disease, abdominal pain, constipation, diarrhea, nausea, vomiting and pancreatitis. Do not take this medication if you have a history or family history of multiple endocrine neoplasia syndrome type 2. Side effects reviewed, pt to contact office should one occur. Rituxan Pregnancy And Lactation Text: This medication is Pregnancy Category C and it isn't know if it is safe during pregnancy. It is unknown if this medication is excreted in breast milk but similar antibodies are known to be excreted. Terbinafine Counseling: Patient counseling regarding adverse effects of terbinafine including but not limited to headache, diarrhea, rash, upset stomach, liver function test abnormalities, itching, taste/smell disturbance, nausea, abdominal pain, and flatulence.  There is a rare possibility of liver failure that can occur when taking terbinafine.  The patient understands that a baseline LFT and kidney function test may be required. The patient verbalized understanding of the proper use and possible adverse effects of terbinafine.  All of the patient's questions and concerns were addressed. Topical Metronidazole Pregnancy And Lactation Text: This medication is Pregnancy Category B and considered safe during pregnancy.  It is also considered safe to use while breastfeeding. Low Dose Naltrexone Pregnancy And Lactation Text: Naltrexone is pregnancy category C.  There have been no adequate and well-controlled studies in pregnant women.  It should be used in pregnancy only if the potential benefit justifies the potential risk to the fetus.   Limited data indicates that naltrexone is minimally excreted into breastmilk. Doxycycline Counseling:  Patient counseled regarding possible photosensitivity and increased risk for sunburn.  Patient instructed to avoid sunlight, if possible.  When exposed to sunlight, patients should wear protective clothing, sunglasses, and sunscreen.  The patient was instructed to call the office immediately if the following severe adverse effects occur:  hearing changes, easy bruising/bleeding, severe headache, or vision changes.  The patient verbalized understanding of the proper use and possible adverse effects of doxycycline.  All of the patient's questions and concerns were addressed. Xolair Counseling:  Patient informed of potential adverse effects including but not limited to fever, muscle aches, rash and allergic reactions.  The patient verbalized understanding of the proper use and possible adverse effects of Xolair.  All of the patient's questions and concerns were addressed. Qbrexza Pregnancy And Lactation Text: There is no available data on Qbrexza use in pregnant women.  There is no available data on Qbrexza use in lactation. Oxybutynin Counseling:  I discussed with the patient the risks of oxybutynin including but not limited to skin rash, drowsiness, dry mouth, difficulty urinating, and blurred vision. Ilumya Counseling: I discussed with the patient the risks of tildrakizumab including but not limited to immunosuppression, malignancy, posterior leukoencephalopathy syndrome, and serious infections.  The patient understands that monitoring is required including a PPD at baseline and must alert us or the primary physician if symptoms of infection or other concerning signs are noted. Niacinamide Counseling: I recommended taking niacin or niacinamide, also know as vitamin B3, twice daily. Recent evidence suggests that taking vitamin B3 (500 mg twice daily) can reduce the risk of actinic keratoses and non-melanoma skin cancers. Side effects of vitamin B3 include flushing and headache. Siliq Counseling:  I discussed with the patient the risks of Siliq including but not limited to new or worsening depression, suicidal thoughts and behavior, immunosuppression, malignancy, posterior leukoencephalopathy syndrome, and serious infections.  The patient understands that monitoring is required including a PPD at baseline and must alert us or the primary physician if symptoms of infection or other concerning signs are noted. There is also a special program designed to monitor depression which is required with Siliq. Zepbound Pregnancy And Lactation Text: The fetal risk of this medication is unknown and taking while pregnant is not recommended. It is unknown if this medication is present in breast milk. Terbinafine Pregnancy And Lactation Text: This medication is Pregnancy Category B and is considered safe during pregnancy. It is also excreted in breast milk and breast feeding isn't recommended. Topical Steroids Counseling: I discussed with the patient that prolonged use of topical steroids can result in the increased appearance of superficial blood vessels (telangiectasias), lightening (hypopigmentation) and thinning of the skin (atrophy).  Patient understands to avoid using high potency steroids in skin folds, the groin or the face.  The patient verbalized understanding of the proper use and possible adverse effects of topical steroids.  All of the patient's questions and concerns were addressed. Doxycycline Pregnancy And Lactation Text: This medication is Pregnancy Category D and not consider safe during pregnancy. It is also excreted in breast milk but is considered safe for shorter treatment courses. Xolair Pregnancy And Lactation Text: This medication is Pregnancy Category B and is considered safe during pregnancy. This medication is excreted in breast milk. Bimzelx Counseling:  I discussed with the patient the risks of Bimzelx including but not limited to depression, immunosuppression, allergic reactions and infections.  The patient understands that monitoring is required including a PPD at baseline and must alert us or the primary physician if symptoms of infection or other concerning signs are noted. Rhofade Counseling: Rhofade is a topical medication which can decrease superficial blood flow where applied. Side effects are uncommon and include stinging, redness and allergic reactions. Arava Counseling:  Patient counseled regarding adverse effects of Arava including but not limited to nausea, vomiting, abnormalities in liver function tests. Patients may develop mouth sores, rash, diarrhea, and abnormalities in blood counts. The patient understands that monitoring is required including LFTs and blood counts.  There is a rare possibility of scarring of the liver and lung problems that can occur when taking methotrexate. Persistent nausea, loss of appetite, pale stools, dark urine, cough, and shortness of breath should be reported immediately. Patient advised to discontinue Arava treatment and consult with a physician prior to attempting conception. The patient will have to undergo a treatment to eliminate Arava from the body prior to conception. Oxybutynin Pregnancy And Lactation Text: This medication is Pregnancy Category B and is considered safe during pregnancy. It is unknown if it is excreted in breast milk. Elidel Counseling: Patient may experience a mild burning sensation during topical application. Elidel is not approved in children less than 2 years of age. There have been case reports of hematologic and skin malignancies in patients using topical calcineurin inhibitors although causality is questionable. Topical Steroids Applications Pregnancy And Lactation Text: Most topical steroids are considered safe to use during pregnancy and lactation.  Any topical steroid applied to the breast or nipple should be washed off before breastfeeding. Erythromycin Counseling:  I discussed with the patient the risks of erythromycin including but not limited to GI upset, allergic reaction, drug rash, diarrhea, increase in liver enzymes, and yeast infections. Bimzelx Pregnancy And Lactation Text: This medication crosses the placenta and the safety is uncertain during pregnancy. It is unknown if this medication is present in breast milk. Rhofade Pregnancy And Lactation Text: This medication has not been assigned a Pregnancy Risk Category. It is unknown if the medication is excreted in breast milk. Propranolol Counseling:  I discussed with the patient the risks of propranolol including but not limited to low heart rate, low blood pressure, low blood sugar, restlessness and increased cold sensitivity. They should call the office if they experience any of these side effects. Elidel Pregnancy And Lactation Text: This medication is Pregnancy Category C. It is unknown if this medication is excreted in breast milk. Infliximab Counseling:  I discussed with the patient the risks of infliximab including but not limited to myelosuppression, immunosuppression, autoimmune hepatitis, demyelinating diseases, lymphoma, and serious infections.  The patient understands that monitoring is required including a PPD at baseline and must alert us or the primary physician if symptoms of infection or other concerning signs are noted. Aklief counseling:  Patient advised to apply a pea-sized amount only at bedtime and wait 30 minutes after washing their face before applying.  If too drying, patient may add a non-comedogenic moisturizer.  The most commonly reported side effects including irritation, redness, scaling, dryness, stinging, burning, itching, and increased risk of sunburn.  The patient verbalized understanding of the proper use and possible adverse effects of retinoids.  All of the patient's questions and concerns were addressed. Simlandi Counseling:  I discussed with the patient the risks of adalimumab including but not limited to myelosuppression, immunosuppression, autoimmune hepatitis, demyelinating diseases, lymphoma, and serious infections.  The patient understands that monitoring is required including a PPD at baseline and must alert us or the primary physician if symptoms of infection or other concerning signs are noted. Topical Sulfur Applications Counseling: Topical Sulfur Counseling: Patient counseled that this medication may cause skin irritation or allergic reactions.  In the event of skin irritation, the patient was advised to reduce the amount of the drug applied or use it less frequently.   The patient verbalized understanding of the proper use and possible adverse effects of topical sulfur application.  All of the patient's questions and concerns were addressed. Erythromycin Pregnancy And Lactation Text: This medication is Pregnancy Category B and is considered safe during pregnancy. It is also excreted in breast milk. Cimzia Counseling:  I discussed with the patient the risks of Cimzia including but not limited to immunosuppression, allergic reactions and infections.  The patient understands that monitoring is required including a PPD at baseline and must alert us or the primary physician if symptoms of infection or other concerning signs are noted. Clofazimine Counseling:  I discussed with the patient the risks of clofazimine including but not limited to skin and eye pigmentation, liver damage, nausea/vomiting, gastrointestinal bleeding and allergy. Propranolol Pregnancy And Lactation Text: This medication is Pregnancy Category C and it isn't known if it is safe during pregnancy. It is excreted in breast milk. Solaraze Counseling:  I discussed with the patient the risks of Solaraze including but not limited to erythema, scaling, itching, weeping, crusting, and pain. Eucrisa Counseling: Patient may experience a mild burning sensation during topical application. Eucrisa is not approved in children less than 2 years of age. Cyclophosphamide Counseling:  I discussed with the patient the risks of cyclophosphamide including but not limited to hair loss, hormonal abnormalities, decreased fertility, abdominal pain, diarrhea, nausea and vomiting, bone marrow suppression and infection. The patient understands that monitoring is required while taking this medication. Bactrim Pregnancy And Lactation Text: This medication is Pregnancy Category D and is known to cause fetal risk.  It is also excreted in breast milk. Ivermectin Pregnancy And Lactation Text: This medication is Pregnancy Category C and it isn't known if it is safe during pregnancy. It is also excreted in breast milk. Zoryve Pregnancy And Lactation Text: It is unknown if this medication can cause problems during pregnancy and breastfeeding. Isotretinoin Counseling: Patient should get monthly blood tests, not donate blood, not drive at night if vision affected, not share medication, and not undergo elective surgery for 6 months after tx completed. Side effects reviewed, pt to contact office should one occur. Calcipotriene Pregnancy And Lactation Text: The use of this medication during pregnancy or lactation is not recommended as there is insufficient data. Olanzapine Pregnancy And Lactation Text: This medication is pregnancy category C.   There are no adequate and well controlled trials with olanzapine in pregnant females.  Olanzapine should be used during pregnancy only if the potential benefit justifies the potential risk to the fetus.   In a study in lactating healthy women, olanzapine was excreted in breast milk.  It is recommended that women taking olanzapine should not breast feed. Sarecycline Counseling: Patient advised regarding possible photosensitivity and discoloration of the teeth, skin, lips, tongue and gums.  Patient instructed to avoid sunlight, if possible.  When exposed to sunlight, patients should wear protective clothing, sunglasses, and sunscreen.  The patient was instructed to call the office immediately if the following severe adverse effects occur:  hearing changes, easy bruising/bleeding, severe headache, or vision changes.  The patient verbalized understanding of the proper use and possible adverse effects of sarecycline.  All of the patient's questions and concerns were addressed. Finasteride Pregnancy And Lactation Text: This medication is absolutely contraindicated during pregnancy. It is unknown if it is excreted in breast milk. Semaglutide Counseling: I reviewed the possible side effects including: thyroid tumors, kidney disease, gallbladder disease, abdominal pain, constipation, diarrhea, nausea, vomiting and pancreatitis. Do not take this medication if you have a history or family history of multiple endocrine neoplasia syndrome type 2. Side effects reviewed, pt to contact office should one occur. Itraconazole Counseling:  I discussed with the patient the risks of itraconazole including but not limited to liver damage, nausea/vomiting, neuropathy, and severe allergy.  The patient understands that this medication is best absorbed when taken with acidic beverages such as non-diet cola or ginger ale.  The patient understands that monitoring is required including baseline LFTs and repeat LFTs at intervals.  The patient understands that they are to contact us or the primary physician if concerning signs are noted. Use Enhanced Medication Counseling?: No Glycopyrrolate Pregnancy And Lactation Text: This medication is Pregnancy Category B and is considered safe during pregnancy. It is unknown if it is excreted breast milk. Cephalexin Counseling: I counseled the patient regarding use of cephalexin as an antibiotic for prophylactic and/or therapeutic purposes. Cephalexin (commonly prescribed under brand name Keflex) is a cephalosporin antibiotic which is active against numerous classes of bacteria, including most skin bacteria. Side effects may include nausea, diarrhea, gastrointestinal upset, rash, hives, yeast infections, and in rare cases, hepatitis, kidney disease, seizures, fever, confusion, neurologic symptoms, and others. Patients with severe allergies to penicillin medications are cautioned that there is about a 10% incidence of cross-reactivity with cephalosporins. When possible, patients with penicillin allergies should use alternatives to cephalosporins for antibiotic therapy. Cellcept Pregnancy And Lactation Text: This medication is Pregnancy Category D and isn't considered safe during pregnancy. It is unknown if this medication is excreted in breast milk. Taltz Counseling: I discussed with the patient the risks of ixekizumab including but not limited to immunosuppression, serious infections, worsening of inflammatory bowel disease and drug reactions.  The patient understands that monitoring is required including a PPD at baseline and must alert us or the primary physician if symptoms of infection or other concerning signs are noted. Doxepin Counseling:  Patient advised that the medication is sedating and not to drive a car after taking this medication. Patient informed of potential adverse effects including but not limited to dry mouth, urinary retention, and blurry vision.  The patient verbalized understanding of the proper use and possible adverse effects of doxepin.  All of the patient's questions and concerns were addressed. Sotyktu Pregnancy And Lactation Text: There is insufficient data to evaluate whether or not Sotyktu is safe to use during pregnancy.? ?It is not known if Sotyktu passes into breast milk and whether or not it is safe to use when breastfeeding.?? Cantharidin Counseling:  I discussed with the patient the risks of Cantharidin including but not limited to pain, redness, burning, itching, and blistering. Zyclara Counseling:  I discussed with the patient the risks of imiquimod including but not limited to erythema, scaling, itching, weeping, crusting, and pain.  Patient understands that the inflammatory response to imiquimod is variable from person to person and was educated regarded proper titration schedule.  If flu-like symptoms develop, patient knows to discontinue the medication and contact us. Oral Minoxidil Counseling- I discussed with the patient the risks of oral minoxidil including but not limited to shortness of breath, swelling of the feet or ankles, dizziness, lightheadedness, unwanted hair growth and allergic reaction.  The patient verbalized understanding of the proper use and possible adverse effects of oral minoxidil.  All of the patient's questions and concerns were addressed. Odomzo Counseling- I discussed with the patient the risks of Odomzo including but not limited to nausea, vomiting, diarrhea, constipation, weight loss, changes in the sense of taste, decreased appetite, muscle spasms, and hair loss.  The patient verbalized understanding of the proper use and possible adverse effects of Odomzo.  All of the patient's questions and concerns were addressed. Isotretinoin Pregnancy And Lactation Text: This medication is Pregnancy Category X and is considered extremely dangerous during pregnancy. It is unknown if it is excreted in breast milk. Birth Control Pills Counseling: Birth Control Pill Counseling: I discussed with the patient the potential side effects of OCPs including but not limited to increased risk of stroke, heart attack, thrombophlebitis, deep venous thrombosis, hepatic adenomas, breast changes, GI upset, headaches, and depression.  The patient verbalized understanding of the proper use and possible adverse effects of OCPs. All of the patient's questions and concerns were addressed. Libtayo Counseling- I discussed with the patient the risks of Libtayo including but not limited to nausea, vomiting, diarrhea, and bone or muscle pain.  The patient verbalized understanding of the proper use and possible adverse effects of Libtayo.  All of the patient's questions and concerns were addressed. Humira Counseling:  I discussed with the patient the risks of adalimumab including but not limited to myelosuppression, immunosuppression, autoimmune hepatitis, demyelinating diseases, lymphoma, and serious infections.  The patient understands that monitoring is required including a PPD at baseline and must alert us or the primary physician if symptoms of infection or other concerning signs are noted. Hydroxychloroquine Counseling:  I discussed with the patient that a baseline ophthalmologic exam is needed at the start of therapy and every year thereafter while on therapy. A CBC may also be warranted for monitoring.  The side effects of this medication were discussed with the patient, including but not limited to agranulocytosis, aplastic anemia, seizures, rashes, retinopathy, and liver toxicity. Patient instructed to call the office should any adverse effect occur.  The patient verbalized understanding of the proper use and possible adverse effects of Plaquenil.  All the patient's questions and concerns were addressed. Nemluvio Counseling: I discussed with the patient the risks of nemolizumab including but not limited to headache, gastrointestinal complaints, nasopharyngitis, musculoskeletal complaints, injection site reactions, and allergic reactions. The patient understands that monitoring is required and they must alert us or the primary physician if any side effects are noted. Itraconazole Pregnancy And Lactation Text: This medication is Pregnancy Category C and it isn't know if it is safe during pregnancy. It is also excreted in breast milk. Topical Ketoconazole Counseling: Patient counseled that this medication may cause skin irritation or allergic reactions.  In the event of skin irritation, the patient was advised to reduce the amount of the drug applied or use it less frequently.   The patient verbalized understanding of the proper use and possible adverse effects of ketoconazole.  All of the patient's questions and concerns were addressed. Protopic Counseling: Patient may experience a mild burning sensation during topical application. Protopic is not approved in children less than 2 years of age. There have been case reports of hematologic and skin malignancies in patients using topical calcineurin inhibitors although causality is questionable. Cephalexin Pregnancy And Lactation Text: This medication is Pregnancy Category B and considered safe during pregnancy.  It is also excreted in breast milk but can be used safely for shorter doses. Doxepin Pregnancy And Lactation Text: This medication is Pregnancy Category C and it isn't known if it is safe during pregnancy. It is also excreted in breast milk and breast feeding isn't recommended. Xeljanz Counseling: I discussed with the patient the risks of Xeljanz therapy including increased risk of infection, liver issues, headache, diarrhea, or cold symptoms. Live vaccines should be avoided. They were instructed to call if they have any problems. Oral Minoxidil Pregnancy And Lactation Text: This medication should only be used when clearly needed if you are pregnant, attempting to become pregnant or breast feeding. 5-Fu Counseling: 5-Fluorouracil Counseling:  I discussed with the patient the risks of 5-fluorouracil including but not limited to erythema, scaling, itching, weeping, crusting, and pain. High Dose Vitamin A Counseling: Side effects reviewed, pt to contact office should one occur. Libtayo Pregnancy And Lactation Text: This medication is contraindicated in pregnancy and when breast feeding. Hydroxychloroquine Pregnancy And Lactation Text: This medication has been shown to cause fetal harm but it isn't assigned a Pregnancy Risk Category. There are small amounts excreted in breast milk. Tetracycline Counseling: Patient counseled regarding possible photosensitivity and increased risk for sunburn.  Patient instructed to avoid sunlight, if possible.  When exposed to sunlight, patients should wear protective clothing, sunglasses, and sunscreen.  The patient was instructed to call the office immediately if the following severe adverse effects occur:  hearing changes, easy bruising/bleeding, severe headache, or vision changes.  The patient verbalized understanding of the proper use and possible adverse effects of tetracycline.  All of the patient's questions and concerns were addressed. Patient understands to avoid pregnancy while on therapy due to potential birth defects. Wegovy Counseling: I reviewed the possible side effects including: thyroid tumors, kidney disease, gallbladder disease, abdominal pain, constipation, diarrhea, nausea, vomiting and pancreatitis. Do not take this medication if you have a history or family history of multiple endocrine neoplasia syndrome type 2. Side effects reviewed, pt to contact office should one occur. Birth Control Pills Pregnancy And Lactation Text: This medication should be avoided if pregnant and for the first 30 days post-partum. Nemluvio Pregnancy And Lactation Text: It is not known if Nemluvio causes fetal harm or is present in breast milk. Please proceed with caution if patients who are pregnant or breastfeeding. Ketoconazole Counseling:   Patient counseled regarding improving absorption with orange juice.  Adverse effects include but are not limited to breast enlargement, headache, diarrhea, nausea, upset stomach, liver function test abnormalities, taste disturbance, and stomach pain.  There is a rare possibility of liver failure that can occur when taking ketoconazole. The patient understands that monitoring of LFTs may be required, especially at baseline. The patient verbalized understanding of the proper use and possible adverse effects of ketoconazole.  All of the patient's questions and concerns were addressed. Protopic Pregnancy And Lactation Text: This medication is Pregnancy Category C. It is unknown if this medication is excreted in breast milk when applied topically. Hydroxyzine Counseling: Patient advised that the medication is sedating and not to drive a car after taking this medication.  Patient informed of potential adverse effects including but not limited to dry mouth, urinary retention, and blurry vision.  The patient verbalized understanding of the proper use and possible adverse effects of hydroxyzine.  All of the patient's questions and concerns were addressed. Clindamycin Counseling: I counseled the patient regarding use of clindamycin as an antibiotic for prophylactic and/or therapeutic purposes. Clindamycin is active against numerous classes of bacteria, including skin bacteria. Side effects may include nausea, diarrhea, gastrointestinal upset, rash, hives, yeast infections, and in rare cases, colitis. Tremfya Counseling: I discussed with the patient the risks of guselkumab including but not limited to immunosuppression, serious infections, and drug reactions.  The patient understands that monitoring is required including a PPD at baseline and must alert us or the primary physician if symptoms of infection or other concerning signs are noted. Xellupez Pregnancy And Lactation Text: This medication is Pregnancy Category D and is not considered safe during pregnancy.  The risk during breast feeding is also uncertain. Otezla Counseling: The side effects of Otezla were discussed with the patient, including but not limited to worsening or new depression, weight loss, diarrhea, nausea, upper respiratory tract infection, and headache. Patient instructed to call the office should any adverse effect occur.  The patient verbalized understanding of the proper use and possible adverse effects of Otezla.  All the patient's questions and concerns were addressed. Hyrimoz Counseling:  I discussed with the patient the risks of adalimumab including but not limited to myelosuppression, immunosuppression, autoimmune hepatitis, demyelinating diseases, lymphoma, and serious infections.  The patient understands that monitoring is required including a PPD at baseline and must alert us or the primary physician if symptoms of infection or other concerning signs are noted. High Dose Vitamin A Pregnancy And Lactation Text: High dose vitamin A therapy is contraindicated during pregnancy and breast feeding. Rituxan Counseling:  I discussed with the patient the risks of Rituxan infusions. Side effects can include infusion reactions, severe drug rashes including mucocutaneous reactions, reactivation of latent hepatitis and other infections and rarely progressive multifocal leukoencephalopathy.  All of the patient's questions and concerns were addressed. Spironolactone Counseling: Patient advised regarding risks of diarrhea, abdominal pain, hyperkalemia, birth defects (for female patients), liver toxicity and renal toxicity. The patient may need blood work to monitor liver and kidney function and potassium levels while on therapy. The patient verbalized understanding of the proper use and possible adverse effects of spironolactone.  All of the patient's questions and concerns were addressed. Low Dose Naltrexone Counseling- I discussed with the patient the potential risks and side effects of low dose naltrexone including but not limited to: more vivid dreams, headaches, nausea, vomiting, abdominal pain, fatigue, dizziness, and anxiety. Topical Metronidazole Counseling: Metronidazole is a topical antibiotic medication. You may experience burning, stinging, redness, or allergic reactions.  Please call our office if you develop any problems from using this medication. Ketoconazole Pregnancy And Lactation Text: This medication is Pregnancy Category C and it isn't know if it is safe during pregnancy. It is also excreted in breast milk and breast feeding isn't recommended. Clindamycin Pregnancy And Lactation Text: This medication can be used in pregnancy if certain situations. Clindamycin is also present in breast milk. Hydroxyzine Pregnancy And Lactation Text: This medication is not safe during pregnancy and should not be taken. It is also excreted in breast milk and breast feeding isn't recommended. Qbrexza Counseling:  I discussed with the patient the risks of Qbrexza including but not limited to headache, mydriasis, blurred vision, dry eyes, nasal dryness, dry mouth, dry throat, dry skin, urinary hesitation, and constipation.  Local skin reactions including erythema, burning, stinging, and itching can also occur. Otezla Pregnancy And Lactation Text: This medication is Pregnancy Category C and it isn't known if it is safe during pregnancy. It is unknown if it is excreted in breast milk. Olumiant Counseling: I discussed with the patient the risks of Olumiant therapy including but not limited to upper respiratory tract infections, shingles, cold sores, and nausea. Live vaccines should be avoided.  This medication has been linked to serious infections; higher rate of mortality; malignancy and lymphoproliferative disorders; major adverse cardiovascular events; thrombosis; gastrointestinal perforations; neutropenia; lymphopenia; anemia; liver enzyme elevations; and lipid elevations. Acitretin Counseling:  I discussed with the patient the risks of acitretin including but not limited to hair loss, dry lips/skin/eyes, liver damage, hyperlipidemia, depression/suicidal ideation, photosensitivity.  Serious rare side effects can include but are not limited to pancreatitis, pseudotumor cerebri, bony changes, clot formation/stroke/heart attack.  Patient understands that alcohol is contraindicated since it can result in liver toxicity and significantly prolong the elimination of the drug by many years. Benzoyl Peroxide Pregnancy And Lactation Text: This medication is Pregnancy Category C. It is unknown if benzoyl peroxide is excreted in breast milk. Winlevi Pregnancy And Lactation Text: This medication is considered safe during pregnancy and breastfeeding. Niacinamide Pregnancy And Lactation Text: These medications are considered safe during pregnancy. Quinolones Counseling:  I discussed with the patient the risks of fluoroquinolones including but not limited to GI upset, allergic reaction, drug rash, diarrhea, dizziness, photosensitivity, yeast infections, liver function test abnormalities, tendonitis/tendon rupture. Dupixent Pregnancy And Lactation Text: This medication likely crosses the placenta but the risk for the fetus is uncertain. This medication is excreted in breast milk. Opioid Pregnancy And Lactation Text: These medications can lead to premature delivery and should be avoided during pregnancy. These medications are also present in breast milk in small amounts. Dapsone Pregnancy And Lactation Text: This medication is Pregnancy Category C and is not considered safe during pregnancy or breast feeding. Fluconazole Counseling:  Patient counseled regarding adverse effects of fluconazole including but not limited to headache, diarrhea, nausea, upset stomach, liver function test abnormalities, taste disturbance, and stomach pain.  There is a rare possibility of liver failure that can occur when taking fluconazole.  The patient understands that monitoring of LFTs and kidney function test may be required, especially at baseline. The patient verbalized understanding of the proper use and possible adverse effects of fluconazole.  All of the patient's questions and concerns were addressed. Ozempic Counseling: I reviewed the possible side effects including: thyroid tumors, kidney disease, gallbladder disease, abdominal pain, constipation, diarrhea, nausea, vomiting and pancreatitis. Do not take this medication if you have a history or family history of multiple endocrine neoplasia syndrome type 2. Side effects reviewed, pt to contact office should one occur. Dutasteride Female Counseling: Dutasteride Counseling:  I discussed with the patient the risks of use of dutasteride including but not limited to decreased libido and sexual dysfunction. Explained the teratogenic nature of the medication and stressed the importance of not getting pregnant during treatment. All of the patient's questions and concerns were addressed. Tranexamic Acid Counseling:  Patient advised of the small risk of bleeding problems with tranexamic acid. They were also instructed to call if they developed any nausea, vomiting or diarrhea. All of the patient's questions and concerns were addressed. Methotrexate Pregnancy And Lactation Text: This medication is Pregnancy Category X and is known to cause fetal harm. This medication is excreted in breast milk. Azithromycin Counseling:  I discussed with the patient the risks of azithromycin including but not limited to GI upset, allergic reaction, drug rash, diarrhea, and yeast infections. Carac Counseling:  I discussed with the patient the risks of Carac including but not limited to erythema, scaling, itching, weeping, crusting, and pain. Olumiant Pregnancy And Lactation Text: Based on animal studies, Olumiant may cause embryo-fetal harm when administered to pregnant women.  The medication should not be used in pregnancy.  Breastfeeding is not recommended during treatment. Spevigo Counseling: I discussed with the patient the risks of Spevigo including but not limited to fatigue, nasuea, vomiting, headache, pruritus, urinary tract infection, an infusion related reactions.  The patient understands that monitoring is required including screening for tuberculosis at baseline and yearly screening thereafter while continuing Spevigo therapy. They should contact us if symptoms of infection or other concerning signs are noted. VTAMA Counseling: I discussed with the patient that VTAMA is not for use in the eyes, mouth or mouth. They should call the office if they develop any signs of allergic reactions to VTAMA. The patient verbalized understanding of the proper use and possible adverse effects of VTAMA.  All of the patient's questions and concerns were addressed. Albendazole Counseling:  I discussed with the patient the risks of albendazole including but not limited to cytopenia, kidney damage, nausea/vomiting and severe allergy.  The patient understands that this medication is being used in an off-label manner. Acitretin Pregnancy And Lactation Text: This medication is Pregnancy Category X and should not be given to women who are pregnant or may become pregnant in the future. This medication is excreted in breast milk. Dutasteride Pregnancy And Lactation Text: This medication is absolutely contraindicated in women, especially during pregnancy and breast feeding. Feminization of male fetuses is possible if taking while pregnant. Nsaids Counseling: NSAID Counseling: I discussed with the patient that NSAIDs should be taken with food. Prolonged use of NSAIDs can result in the development of stomach ulcers.  Patient advised to stop taking NSAIDs if abdominal pain occurs.  The patient verbalized understanding of the proper use and possible adverse effects of NSAIDs.  All of the patient's questions and concerns were addressed. Ebglyss Counseling: I discussed with the patient the risks of lebrikizumab including but not limited to eye inflammation and irritation, cold sores, injection site reactions, allergic reactions and increased risk of parasitic infection. The patient understands that monitoring is required and they must alert us or the primary physician if symptoms of infection or other concerning signs are noted. Tazorac Counseling:  Patient advised that medication is irritating and drying.  Patient may need to apply sparingly and wash off after an hour before eventually leaving it on overnight.  The patient verbalized understanding of the proper use and possible adverse effects of tazorac.  All of the patient's questions and concerns were addressed. Prednisone Counseling:  I discussed with the patient the risks of prolonged use of prednisone including but not limited to weight gain, insomnia, osteoporosis, mood changes, diabetes, susceptibility to infection, glaucoma and high blood pressure.  In cases where prednisone use is prolonged, patients should be monitored with blood pressure checks, serum glucose levels and an eye exam.  Additionally, the patient may need to be placed on GI prophylaxis, PCP prophylaxis, and calcium and vitamin D supplementation and/or a bisphosphonate.  The patient verbalized understanding of the proper use and the possible adverse effects of prednisone.  All of the patient's questions and concerns were addressed. Opzelura Counseling:  I discussed with the patient the risks of Opzelura including but not limited to nasopharngitis, bronchitis, ear infection, eosinophila, hives, diarrhea, folliculitis, tonsillitis, and rhinorrhea.  Taken orally, this medication has been linked to serious infections; higher rate of mortality; malignancy and lymphoproliferative disorders; major adverse cardiovascular events; thrombosis; thrombocytopenia, anemia, and neutropenia; and lipid elevations. Tranexamic Acid Pregnancy And Lactation Text: It is unknown if this medication is safe during pregnancy or breast feeding. Gabapentin Counseling: I discussed with the patient the risks of gabapentin including but not limited to dizziness, somnolence, fatigue and ataxia. Azithromycin Pregnancy And Lactation Text: This medication is considered safe during pregnancy and is also secreted in breast milk. Azathioprine Counseling:  I discussed with the patient the risks of azathioprine including but not limited to myelosuppression, immunosuppression, hepatotoxicity, lymphoma, and infections.  The patient understands that monitoring is required including baseline LFTs, Creatinine, possible TPMP genotyping and weekly CBCs for the first month and then every 2 weeks thereafter.  The patient verbalized understanding of the proper use and possible adverse effects of azathioprine.  All of the patient's questions and concerns were addressed. Spevigo Pregnancy And Lactation Text: The risk during pregnancy and breastfeeding is uncertain with this medication. This medication does cross the placenta. It is unknown if this medication is found in breast milk. Rinvoq Counseling: I discussed with the patient the risks of Rinvoq therapy including but not limited to upper respiratory tract infections, shingles, cold sores, bronchitis, nausea, cough, fever, acne, and headache. Live vaccines should be avoided.  This medication has been linked to serious infections; higher rate of mortality; malignancy and lymphoproliferative disorders; major adverse cardiovascular events; thrombosis; thrombocytopenia, anemia, and neutropenia; lipid elevations; liver enzyme elevations; and gastrointestinal perforations. Nsaids Pregnancy And Lactation Text: These medications are considered safe up to 30 weeks gestation. It is excreted in breast milk. Bexarotene Counseling:  I discussed with the patient the risks of bexarotene including but not limited to hair loss, dry lips/skin/eyes, liver abnormalities, hyperlipidemia, pancreatitis, depression/suicidal ideation, photosensitivity, drug rash/allergic reactions, hypothyroidism, anemia, leukopenia, infection, cataracts, and teratogenicity.  Patient understands that they will need regular blood tests to check lipid profile, liver function tests, white blood cell count, thyroid function tests and pregnancy test if applicable. Finasteride Male Counseling: Finasteride Counseling:  I discussed with the patient the risks of use of finasteride including but not limited to decreased libido, decreased ejaculate volume, gynecomastia, and depression. Women should not handle medication.  All of the patient's questions and concerns were addressed. Ebglyss Pregnancy And Lactation Text: This medication likely crosses the placenta but the risk for the fetus is uncertain. It is unknown if this medication is excreted in breast milk. Saxenda Counseling: I reviewed the possible side effects including: thyroid tumors, kidney disease, gallbladder disease, abdominal pain, constipation, diarrhea, nausea, vomiting and pancreatitis. Do not take this medication if you have a history or family history of multiple endocrine neoplasia syndrome type 2. Side effects reviewed, pt to contact office should one occur. Rifampin Counseling: I discussed with the patient the risks of rifampin including but not limited to liver damage, kidney damage, red-orange body fluids, nausea/vomiting and severe allergy. Valtrex Counseling: I discussed with the patient the risks of valacyclovir including but not limited to kidney damage, nausea, vomiting and severe allergy.  The patient understands that if the infection seems to be worsening or is not improving, they are to call. Griseofulvin Counseling:  I discussed with the patient the risks of griseofulvin including but not limited to photosensitivity, cytopenia, liver damage, nausea/vomiting and severe allergy.  The patient understands that this medication is best absorbed when taken with a fatty meal (e.g., ice cream or french fries). Opzelura Pregnancy And Lactation Text: There is insufficient data to evaluate drug-associated risk for major birth defects, miscarriage, or other adverse maternal or fetal outcomes.  There is a pregnancy registry that monitors pregnancy outcomes in pregnant persons exposed to the medication during pregnancy.  It is unknown if this medication is excreted in breast milk.  Do not breastfeed during treatment and for about 4 weeks after the last dose. Tazorac Pregnancy And Lactation Text: This medication is not safe during pregnancy. It is unknown if this medication is excreted in breast milk. Rinvoq Pregnancy And Lactation Text: Based on animal studies, Rinvoq may cause embryo-fetal harm when administered to pregnant women.  The medication should not be used in pregnancy.  Breastfeeding is not recommended during treatment and for 6 days after the last dose. Bactrim Counseling:  I discussed with the patient the risks of sulfa antibiotics including but not limited to GI upset, allergic reaction, drug rash, diarrhea, dizziness, photosensitivity, and yeast infections.  Rarely, more serious reactions can occur including but not limited to aplastic anemia, agranulocytosis, methemoglobinemia, blood dyscrasias, liver or kidney failure, lung infiltrates or desquamative/blistering drug rashes. Cimetidine Counseling:  I discussed with the patient the risks of Cimetidine including but not limited to gynecomastia, headache, diarrhea, nausea, drowsiness, arrhythmias, pancreatitis, skin rashes, psychosis, bone marrow suppression and kidney toxicity. Stelara Counseling:  I discussed with the patient the risks of ustekinumab including but not limited to immunosuppression, malignancy, posterior leukoencephalopathy syndrome, and serious infections.  The patient understands that monitoring is required including a PPD at baseline and must alert us or the primary physician if symptoms of infection or other concerning signs are noted. Calcipotriene Counseling:  I discussed with the patient the risks of calcipotriene including but not limited to erythema, scaling, itching, and irritation. Zoryve Counseling:  I discussed with the patient that Zoryve is not for use in the eyes, mouth or vagina. The most commonly reported side effects include diarrhea, headache, insomnia, application site pain, upper respiratory tract infections, and urinary tract infections.  All of the patient's questions and concerns were addressed. Olanzapine Counseling- I discussed with the patient the common side effects of olanzapine including but are not limited to: lack of energy, dry mouth, increased appetite, sleepiness, tremor, constipation, dizziness, changes in behavior, or restlessness.  Explained that teenagers are more likely to experience headaches, abdominal pain, pain in the arms or legs, tiredness, and sleepiness.  Serious side effects include but are not limited: increased risk of death in elderly patients who are confused, have memory loss, or dementia-related psychosis; hyperglycemia; increased cholesterol and triglycerides; and weight gain. Bexarotene Pregnancy And Lactation Text: This medication is Pregnancy Category X and should not be given to women who are pregnant or may become pregnant. This medication should not be used if you are breast feeding. Erivedge Counseling- I discussed with the patient the risks of Erivedge including but not limited to nausea, vomiting, diarrhea, constipation, weight loss, changes in the sense of taste, decreased appetite, muscle spasms, and hair loss.  The patient verbalized understanding of the proper use and possible adverse effects of Erivedge.  All of the patient's questions and concerns were addressed. Rifampin Pregnancy And Lactation Text: This medication is Pregnancy Category C and it isn't know if it is safe during pregnancy. It is also excreted in breast milk and should not be used if you are breast feeding. Enbrel Counseling:  I discussed with the patient the risks of etanercept including but not limited to myelosuppression, immunosuppression, autoimmune hepatitis, demyelinating diseases, lymphoma, and infections.  The patient understands that monitoring is required including a PPD at baseline and must alert us or the primary physician if symptoms of infection or other concerning signs are noted. Detail Level: Simple Ivermectin Counseling:  Patient instructed to take medication on an empty stomach with a full glass of water.  Patient informed of potential adverse effects including but not limited to nausea, diarrhea, dizziness, itching, and swelling of the extremities or lymph nodes.  The patient verbalized understanding of the proper use and possible adverse effects of ivermectin.  All of the patient's questions and concerns were addressed. Glycopyrrolate Counseling:  I discussed with the patient the risks of glycopyrrolate including but not limited to skin rash, drowsiness, dry mouth, difficulty urinating, and blurred vision. Finasteride Female Counseling: Finasteride Counseling:  I discussed with the patient the risks of use of finasteride including but not limited to decreased libido and sexual dysfunction. Explained the teratogenic nature of the medication and stressed the importance of not getting pregnant during treatment. All of the patient's questions and concerns were addressed. Valtrex Pregnancy And Lactation Text: this medication is Pregnancy Category B and is considered safe during pregnancy. This medication is not directly found in breast milk but it's metabolite acyclovir is present. Griseofulvin Pregnancy And Lactation Text: This medication is Pregnancy Category X and is known to cause serious birth defects. It is unknown if this medication is excreted in breast milk but breast feeding should be avoided. Picato Counseling:  I discussed with the patient the risks of Picato including but not limited to erythema, scaling, itching, weeping, crusting, and pain. Topical Clindamycin Counseling: Patient counseled that this medication may cause skin irritation or allergic reactions.  In the event of skin irritation, the patient was advised to reduce the amount of the drug applied or use it less frequently.   The patient verbalized understanding of the proper use and possible adverse effects of clindamycin.  All of the patient's questions and concerns were addressed. Cellcept Counseling:  I discussed with the patient the risks of mycophenolate mofetil including but not limited to infection/immunosuppression, GI upset, hypokalemia, hypercholesterolemia, bone marrow suppression, lymphoproliferative disorders, malignancy, GI ulceration/bleed/perforation, colitis, interstitial lung disease, kidney failure, progressive multifocal leukoencephalopathy, and birth defects.  The patient understands that monitoring is required including a baseline creatinine and regular CBC testing. In addition, patient must alert us immediately if symptoms of infection or other concerning signs are noted. Sotyktu Counseling:  I discussed the most common side effects of Sotyktu including: common cold, sore throat, sinus infections, cold sores, canker sores, folliculitis, and acne.? I also discussed more serious side effects of Sotyktu including but not limited to: serious allergic reactions; increased risk for infections such as TB; cancers such as lymphomas; rhabdomyolysis and elevated CPK; and elevated triglycerides and liver enzymes.?